# Patient Record
Sex: MALE | Race: BLACK OR AFRICAN AMERICAN | Employment: OTHER | URBAN - METROPOLITAN AREA
[De-identification: names, ages, dates, MRNs, and addresses within clinical notes are randomized per-mention and may not be internally consistent; named-entity substitution may affect disease eponyms.]

---

## 2017-01-04 RX ORDER — HYDRALAZINE HYDROCHLORIDE 50 MG/1
TABLET, FILM COATED ORAL
Qty: 60 TABLET | Refills: 2 | Status: SHIPPED | OUTPATIENT
Start: 2017-01-04 | End: 2017-04-18

## 2017-02-28 ENCOUNTER — APPOINTMENT (OUTPATIENT)
Dept: LAB | Age: 72
End: 2017-02-28
Attending: INTERNAL MEDICINE
Payer: COMMERCIAL

## 2017-02-28 ENCOUNTER — OFFICE VISIT (OUTPATIENT)
Dept: INTERNAL MEDICINE CLINIC | Facility: CLINIC | Age: 72
End: 2017-02-28

## 2017-02-28 VITALS
RESPIRATION RATE: 16 BRPM | SYSTOLIC BLOOD PRESSURE: 128 MMHG | HEIGHT: 69.5 IN | DIASTOLIC BLOOD PRESSURE: 76 MMHG | HEART RATE: 69 BPM | TEMPERATURE: 99 F | WEIGHT: 216.75 LBS | BODY MASS INDEX: 31.38 KG/M2 | OXYGEN SATURATION: 98 %

## 2017-02-28 DIAGNOSIS — I10 ESSENTIAL HYPERTENSION: Chronic | ICD-10-CM

## 2017-02-28 DIAGNOSIS — I10 ESSENTIAL HYPERTENSION: Primary | Chronic | ICD-10-CM

## 2017-02-28 LAB
BUN BLD-MCNC: 9 MG/DL (ref 8–20)
CALCIUM BLD-MCNC: 9.3 MG/DL (ref 8.3–10.3)
CHLORIDE: 105 MMOL/L (ref 101–111)
CO2: 32 MMOL/L (ref 22–32)
CREAT BLD-MCNC: 1.44 MG/DL (ref 0.7–1.3)
GLUCOSE BLD-MCNC: 92 MG/DL (ref 70–99)
POTASSIUM SERPL-SCNC: 4 MMOL/L (ref 3.6–5.1)
SODIUM SERPL-SCNC: 143 MMOL/L (ref 136–144)

## 2017-02-28 PROCEDURE — 99213 OFFICE O/P EST LOW 20 MIN: CPT | Performed by: INTERNAL MEDICINE

## 2017-02-28 PROCEDURE — 80048 BASIC METABOLIC PNL TOTAL CA: CPT

## 2017-02-28 PROCEDURE — 36415 COLL VENOUS BLD VENIPUNCTURE: CPT

## 2017-02-28 NOTE — PROGRESS NOTES
[address] [place] [address] 1945 is a 66year old male. Patient presents with:   Follow - Up: 6 month       HPI:       Current Outpatient Prescriptions:  HYDRALAZINE HCL 50 MG Oral Tab TAKE ONE TABLET BY MOUTH TWICE DAILY Disp: 60 tablet Rfl: 2   ATENOL follow - up. Diagnoses and all orders for this visit:    Essential hypertension  -     BASIC METABOLIC PANEL; Future        Patient Instructions   HTN  Monitor blood pressure twice a day and send or call office with readings.          The patient indicat

## 2017-03-07 DIAGNOSIS — R79.89 ELEVATED SERUM CREATININE: Primary | ICD-10-CM

## 2017-03-16 RX ORDER — FELODIPINE 5 MG/1
TABLET, EXTENDED RELEASE ORAL
Qty: 180 TABLET | Refills: 0 | Status: SHIPPED | OUTPATIENT
Start: 2017-03-16 | End: 2017-06-14

## 2017-03-29 RX ORDER — ATENOLOL 100 MG/1
TABLET ORAL
Qty: 90 TABLET | Refills: 0 | Status: SHIPPED | OUTPATIENT
Start: 2017-03-29 | End: 2017-06-29

## 2017-04-19 RX ORDER — HYDRALAZINE HYDROCHLORIDE 50 MG/1
TABLET, FILM COATED ORAL
Qty: 60 TABLET | Refills: 2 | Status: SHIPPED | OUTPATIENT
Start: 2017-04-19 | End: 2017-07-05

## 2017-06-15 RX ORDER — FELODIPINE 5 MG/1
TABLET, EXTENDED RELEASE ORAL
Qty: 180 TABLET | Refills: 0 | Status: SHIPPED | OUTPATIENT
Start: 2017-06-15 | End: 2017-09-14

## 2017-06-29 RX ORDER — ATENOLOL 100 MG/1
TABLET ORAL
Qty: 90 TABLET | Refills: 0 | Status: SHIPPED | OUTPATIENT
Start: 2017-06-29 | End: 2017-09-22

## 2017-07-05 RX ORDER — HYDRALAZINE HYDROCHLORIDE 50 MG/1
50 TABLET, FILM COATED ORAL 2 TIMES DAILY
Qty: 60 TABLET | Refills: 2 | Status: SHIPPED | OUTPATIENT
Start: 2017-07-05 | End: 2017-10-27

## 2017-07-05 NOTE — TELEPHONE ENCOUNTER
Pt should not need refills on atenolol or felodipine at this time. 90 day refills were authorized on 6/29/17 and 6/15/17. Refill for hydralazine sent to pharmacy.

## 2017-07-05 NOTE — TELEPHONE ENCOUNTER
Will be leaving [facility] around 7/24 until end of Aug and needs meds refilled prior to leaving:    ATENOLOL 100 MG Oral Tab    FELODIPINE ER 5 MG Oral Tablet     HYDRALAZINE HCL 50 MG Oral        [id number] - [address], [place] - [address]

## 2017-07-17 ENCOUNTER — LAB ENCOUNTER (OUTPATIENT)
Dept: LAB | Age: 72
End: 2017-07-17
Attending: INTERNAL MEDICINE
Payer: COMMERCIAL

## 2017-07-17 DIAGNOSIS — M88.9 PAGET DISEASE OF BONE: Chronic | ICD-10-CM

## 2017-07-17 DIAGNOSIS — E21.3 HYPERPARATHYROIDISM (HCC): Chronic | ICD-10-CM

## 2017-07-17 LAB — PTH-INTACT SERPL-MCNC: 129.7 PG/ML (ref 11.1–79.5)

## 2017-07-17 PROCEDURE — 83970 ASSAY OF PARATHORMONE: CPT

## 2017-07-17 PROCEDURE — 84080 ASSAY ALKALINE PHOSPHATASES: CPT

## 2017-07-17 PROCEDURE — 36415 COLL VENOUS BLD VENIPUNCTURE: CPT

## 2017-07-17 PROCEDURE — 82523 COLLAGEN CROSSLINKS: CPT

## 2017-07-19 LAB
BONE SPECIFIC ALKALINE PHOSPHATASE: 15.7 UG/L
C-TELOPEPTIDE, BETA-CROSS-LINK: 267 PG/ML

## 2017-09-14 RX ORDER — FELODIPINE 5 MG/1
TABLET, EXTENDED RELEASE ORAL
Qty: 180 TABLET | Refills: 0 | Status: SHIPPED | OUTPATIENT
Start: 2017-09-14 | End: 2017-12-07

## 2017-09-15 ENCOUNTER — OFFICE VISIT (OUTPATIENT)
Dept: INTERNAL MEDICINE CLINIC | Facility: CLINIC | Age: 72
End: 2017-09-15

## 2017-09-15 VITALS
HEART RATE: 76 BPM | WEIGHT: 212 LBS | HEIGHT: 70 IN | RESPIRATION RATE: 16 BRPM | SYSTOLIC BLOOD PRESSURE: 122 MMHG | BODY MASS INDEX: 30.35 KG/M2 | OXYGEN SATURATION: 98 % | DIASTOLIC BLOOD PRESSURE: 74 MMHG | TEMPERATURE: 98 F

## 2017-09-15 DIAGNOSIS — E79.0 HYPERURICEMIA: Chronic | ICD-10-CM

## 2017-09-15 DIAGNOSIS — N28.1 KIDNEY CYSTS: Chronic | ICD-10-CM

## 2017-09-15 DIAGNOSIS — Z00.00 ROUTINE GENERAL MEDICAL EXAMINATION AT A HEALTH CARE FACILITY: Primary | ICD-10-CM

## 2017-09-15 DIAGNOSIS — I10 ESSENTIAL HYPERTENSION: Chronic | ICD-10-CM

## 2017-09-15 PROCEDURE — 90670 PCV13 VACCINE IM: CPT | Performed by: INTERNAL MEDICINE

## 2017-09-15 PROCEDURE — 90686 IIV4 VACC NO PRSV 0.5 ML IM: CPT | Performed by: INTERNAL MEDICINE

## 2017-09-15 PROCEDURE — 90471 IMMUNIZATION ADMIN: CPT | Performed by: INTERNAL MEDICINE

## 2017-09-15 PROCEDURE — 93000 ELECTROCARDIOGRAM COMPLETE: CPT | Performed by: INTERNAL MEDICINE

## 2017-09-15 PROCEDURE — 99397 PER PM REEVAL EST PAT 65+ YR: CPT | Performed by: INTERNAL MEDICINE

## 2017-09-15 PROCEDURE — 90472 IMMUNIZATION ADMIN EACH ADD: CPT | Performed by: INTERNAL MEDICINE

## 2017-09-15 NOTE — PROGRESS NOTES
[address] [place] [address] 1945 is a 74year old male.     Patient presents with:  Physical      HPI:   No cc    Current Outpatient Prescriptions:  FELODIPINE ER 5 MG Oral Tablet 24 Hr TAKE ONE TABLET BY MOUTH TWICE DAILY Disp: 180 tablet Rfl: Tristen Cohn times per week   Gastrointestinal:   Patient denies abdominal pain, acid reflux, blood in stool, vomiting, nausea, heartburn denies any wt loss or gain no change in appetite noted no change in bowel movement noted. Dysphagia none.    Hematology:   Patient d Rhythm: regular. LUNGS:   Auscultation: clear . Chest Shape: normal .   Percussion: normal.   Rales: no .   Respiratory effort: normal .   Rhonchi: no.   Wheezes: no. ABDOMEN:   Bowel sounds: normal.   General: normal.   Hernia: absent.    Liver, Sp ML           The patient indicates understanding of these issues and agrees to the plan. The patient is asked to Return in about 2 weeks (around 9/29/2017) for result discussion.   Myriam Arias MD

## 2017-09-18 ENCOUNTER — LAB ENCOUNTER (OUTPATIENT)
Dept: LAB | Age: 72
End: 2017-09-18
Attending: INTERNAL MEDICINE
Payer: COMMERCIAL

## 2017-09-18 DIAGNOSIS — R79.89 ELEVATED SERUM CREATININE: ICD-10-CM

## 2017-09-18 DIAGNOSIS — E79.0 HYPERURICEMIA: Chronic | ICD-10-CM

## 2017-09-18 DIAGNOSIS — Z00.00 ROUTINE GENERAL MEDICAL EXAMINATION AT A HEALTH CARE FACILITY: ICD-10-CM

## 2017-09-18 LAB
ALBUMIN SERPL-MCNC: 3.4 G/DL (ref 3.5–4.8)
ALP LIVER SERPL-CCNC: 88 U/L (ref 45–117)
ALT SERPL-CCNC: 23 U/L (ref 17–63)
AST SERPL-CCNC: 14 U/L (ref 15–41)
BASOPHILS # BLD AUTO: 0.03 X10(3) UL (ref 0–0.1)
BASOPHILS NFR BLD AUTO: 0.4 %
BILIRUB SERPL-MCNC: 0.5 MG/DL (ref 0.1–2)
BILIRUB UR QL STRIP.AUTO: NEGATIVE
BUN BLD-MCNC: 9 MG/DL (ref 8–20)
CALCIUM BLD-MCNC: 8.4 MG/DL (ref 8.3–10.3)
CHLORIDE: 105 MMOL/L (ref 101–111)
CHOLEST SMN-MCNC: 114 MG/DL (ref ?–200)
CLARITY UR REFRACT.AUTO: CLEAR
CO2: 28 MMOL/L (ref 22–32)
COLOR UR AUTO: YELLOW
CREAT BLD-MCNC: 1.49 MG/DL (ref 0.7–1.3)
EOSINOPHIL # BLD AUTO: 0.05 X10(3) UL (ref 0–0.3)
EOSINOPHIL NFR BLD AUTO: 0.7 %
ERYTHROCYTE [DISTWIDTH] IN BLOOD BY AUTOMATED COUNT: 13.2 % (ref 11.5–16)
EST. AVERAGE GLUCOSE BLD GHB EST-MCNC: 126 MG/DL (ref 68–126)
GLUCOSE BLD-MCNC: 93 MG/DL (ref 70–99)
GLUCOSE UR STRIP.AUTO-MCNC: NEGATIVE MG/DL
HBA1C MFR BLD HPLC: 6 % (ref ?–5.7)
HCT VFR BLD AUTO: 40.8 % (ref 37–53)
HDLC SERPL-MCNC: 35 MG/DL (ref 45–?)
HDLC SERPL: 3.26 {RATIO} (ref ?–4.97)
HGB BLD-MCNC: 13 G/DL (ref 13–17)
IMMATURE GRANULOCYTE COUNT: 0.01 X10(3) UL (ref 0–1)
IMMATURE GRANULOCYTE RATIO %: 0.1 %
KETONES UR STRIP.AUTO-MCNC: NEGATIVE MG/DL
LDLC SERPL CALC-MCNC: 64 MG/DL (ref ?–130)
LDLC SERPL-MCNC: 15 MG/DL (ref 5–40)
LEUKOCYTE ESTERASE UR QL STRIP.AUTO: NEGATIVE
LYMPHOCYTES # BLD AUTO: 2.48 X10(3) UL (ref 0.9–4)
LYMPHOCYTES NFR BLD AUTO: 36.8 %
M PROTEIN MFR SERPL ELPH: 7.7 G/DL (ref 6.1–8.3)
MCH RBC QN AUTO: 29 PG (ref 27–33.2)
MCHC RBC AUTO-ENTMCNC: 31.9 G/DL (ref 31–37)
MCV RBC AUTO: 90.9 FL (ref 80–99)
MONOCYTES # BLD AUTO: 0.7 X10(3) UL (ref 0.1–0.6)
MONOCYTES NFR BLD AUTO: 10.4 %
NEUTROPHIL ABS PRELIM: 3.46 X10 (3) UL (ref 1.3–6.7)
NEUTROPHILS # BLD AUTO: 3.46 X10(3) UL (ref 1.3–6.7)
NEUTROPHILS NFR BLD AUTO: 51.6 %
NITRITE UR QL STRIP.AUTO: NEGATIVE
NONHDLC SERPL-MCNC: 79 MG/DL (ref ?–130)
PH UR STRIP.AUTO: 7 [PH] (ref 4.5–8)
PLATELET # BLD AUTO: 197 10(3)UL (ref 150–450)
POTASSIUM SERPL-SCNC: 3.8 MMOL/L (ref 3.6–5.1)
PROT UR STRIP.AUTO-MCNC: NEGATIVE MG/DL
PSA SERPL-MCNC: 3.74 NG/ML (ref 0.01–4)
RBC # BLD AUTO: 4.49 X10(6)UL (ref 3.8–5.8)
RBC UR QL AUTO: NEGATIVE
RED CELL DISTRIBUTION WIDTH-SD: 44.2 FL (ref 35.1–46.3)
SODIUM SERPL-SCNC: 139 MMOL/L (ref 136–144)
SP GR UR STRIP.AUTO: 1.02 (ref 1–1.03)
THYROXINE (T4): 7.6 UG/DL (ref 4.5–10.9)
TRIGLYCERIDES: 75 MG/DL (ref ?–150)
TSI SER-ACNC: 3.83 MIU/ML (ref 0.35–5.5)
URIC ACID: 7.1 MG/DL (ref 2.4–8.7)
UROBILINOGEN UR STRIP.AUTO-MCNC: <2 MG/DL
WBC # BLD AUTO: 6.7 X10(3) UL (ref 4–13)

## 2017-09-18 PROCEDURE — 83036 HEMOGLOBIN GLYCOSYLATED A1C: CPT

## 2017-09-18 PROCEDURE — 36415 COLL VENOUS BLD VENIPUNCTURE: CPT

## 2017-09-18 PROCEDURE — 81003 URINALYSIS AUTO W/O SCOPE: CPT

## 2017-09-18 PROCEDURE — 84436 ASSAY OF TOTAL THYROXINE: CPT

## 2017-09-18 PROCEDURE — 84153 ASSAY OF PSA TOTAL: CPT

## 2017-09-18 PROCEDURE — 84443 ASSAY THYROID STIM HORMONE: CPT

## 2017-09-18 PROCEDURE — 85025 COMPLETE CBC W/AUTO DIFF WBC: CPT

## 2017-09-18 PROCEDURE — 80053 COMPREHEN METABOLIC PANEL: CPT

## 2017-09-18 PROCEDURE — 80061 LIPID PANEL: CPT

## 2017-09-18 PROCEDURE — 84550 ASSAY OF BLOOD/URIC ACID: CPT

## 2017-09-22 ENCOUNTER — HOSPITAL ENCOUNTER (OUTPATIENT)
Dept: ULTRASOUND IMAGING | Age: 72
Discharge: HOME | End: 2017-09-22
Attending: INTERNAL MEDICINE
Payer: COMMERCIAL

## 2017-09-22 DIAGNOSIS — N28.1 KIDNEY CYSTS: Chronic | ICD-10-CM

## 2017-09-22 PROCEDURE — 76775 US EXAM ABDO BACK WALL LIM: CPT | Performed by: INTERNAL MEDICINE

## 2017-09-22 RX ORDER — ATENOLOL 100 MG/1
TABLET ORAL
Qty: 90 TABLET | Refills: 0 | Status: SHIPPED | OUTPATIENT
Start: 2017-09-22 | End: 2017-12-22

## 2017-10-06 ENCOUNTER — OFFICE VISIT (OUTPATIENT)
Dept: INTERNAL MEDICINE CLINIC | Facility: CLINIC | Age: 72
End: 2017-10-06

## 2017-10-06 VITALS
DIASTOLIC BLOOD PRESSURE: 70 MMHG | SYSTOLIC BLOOD PRESSURE: 142 MMHG | WEIGHT: 214 LBS | HEART RATE: 72 BPM | RESPIRATION RATE: 16 BRPM | OXYGEN SATURATION: 98 % | BODY MASS INDEX: 31.7 KG/M2 | HEIGHT: 69 IN

## 2017-10-06 DIAGNOSIS — H61.22 CERUMEN DEBRIS ON TYMPANIC MEMBRANE OF LEFT EAR: Primary | ICD-10-CM

## 2017-10-06 PROCEDURE — 99213 OFFICE O/P EST LOW 20 MIN: CPT | Performed by: INTERNAL MEDICINE

## 2017-10-06 NOTE — PROGRESS NOTES
[address] [place] [address] 1945 is a 74year old male.     Patient presents with:  Ear Problem: itching      HPI:   Ear/General:    Presents with c/o wax buildup in the left ear      Current Outpatient Prescriptions:  ATENOLOL 100 MG Oral Tab TAKE ONE TAB

## 2017-10-28 RX ORDER — HYDRALAZINE HYDROCHLORIDE 50 MG/1
TABLET, FILM COATED ORAL
Qty: 60 TABLET | Refills: 2 | Status: SHIPPED | OUTPATIENT
Start: 2017-10-28 | End: 2018-01-25

## 2017-12-07 RX ORDER — FELODIPINE 5 MG/1
TABLET, EXTENDED RELEASE ORAL
Qty: 180 TABLET | Refills: 0 | Status: SHIPPED | OUTPATIENT
Start: 2017-12-07 | End: 2018-03-08

## 2017-12-26 RX ORDER — ATENOLOL 100 MG/1
TABLET ORAL
Qty: 90 TABLET | Refills: 0 | Status: SHIPPED | OUTPATIENT
Start: 2017-12-26 | End: 2018-03-23

## 2018-01-26 RX ORDER — HYDRALAZINE HYDROCHLORIDE 50 MG/1
TABLET, FILM COATED ORAL
Qty: 60 TABLET | Refills: 2 | Status: SHIPPED | OUTPATIENT
Start: 2018-01-26 | End: 2018-04-25

## 2018-03-09 RX ORDER — FELODIPINE 5 MG/1
TABLET, EXTENDED RELEASE ORAL
Qty: 180 TABLET | Refills: 0 | Status: SHIPPED | OUTPATIENT
Start: 2018-03-09 | End: 2018-06-07

## 2018-03-23 ENCOUNTER — APPOINTMENT (OUTPATIENT)
Dept: LAB | Age: 73
End: 2018-03-23
Attending: INTERNAL MEDICINE
Payer: COMMERCIAL

## 2018-03-23 ENCOUNTER — OFFICE VISIT (OUTPATIENT)
Dept: INTERNAL MEDICINE CLINIC | Facility: CLINIC | Age: 73
End: 2018-03-23

## 2018-03-23 VITALS
BODY MASS INDEX: 32 KG/M2 | HEART RATE: 72 BPM | WEIGHT: 216 LBS | RESPIRATION RATE: 16 BRPM | OXYGEN SATURATION: 96 % | SYSTOLIC BLOOD PRESSURE: 132 MMHG | TEMPERATURE: 98 F | DIASTOLIC BLOOD PRESSURE: 70 MMHG

## 2018-03-23 DIAGNOSIS — R89.9 ABNORMAL LABORATORY TEST RESULT: ICD-10-CM

## 2018-03-23 DIAGNOSIS — R79.89 ELEVATED SERUM CREATININE: Chronic | ICD-10-CM

## 2018-03-23 DIAGNOSIS — I10 ESSENTIAL HYPERTENSION: Chronic | ICD-10-CM

## 2018-03-23 DIAGNOSIS — I10 ESSENTIAL HYPERTENSION: Primary | Chronic | ICD-10-CM

## 2018-03-23 LAB
BUN BLD-MCNC: 12 MG/DL (ref 8–20)
CALCIUM BLD-MCNC: 8.8 MG/DL (ref 8.3–10.3)
CHLORIDE: 104 MMOL/L (ref 101–111)
CO2: 26 MMOL/L (ref 22–32)
CREAT BLD-MCNC: 1.37 MG/DL (ref 0.7–1.3)
EST. AVERAGE GLUCOSE BLD GHB EST-MCNC: 114 MG/DL (ref 68–126)
GLUCOSE BLD-MCNC: 86 MG/DL (ref 70–99)
HBA1C MFR BLD HPLC: 5.6 % (ref ?–5.7)
POTASSIUM SERPL-SCNC: 4.3 MMOL/L (ref 3.6–5.1)
SODIUM SERPL-SCNC: 138 MMOL/L (ref 136–144)

## 2018-03-23 PROCEDURE — 80048 BASIC METABOLIC PNL TOTAL CA: CPT | Performed by: INTERNAL MEDICINE

## 2018-03-23 PROCEDURE — 83036 HEMOGLOBIN GLYCOSYLATED A1C: CPT | Performed by: INTERNAL MEDICINE

## 2018-03-23 PROCEDURE — 99213 OFFICE O/P EST LOW 20 MIN: CPT | Performed by: INTERNAL MEDICINE

## 2018-03-23 PROCEDURE — 36415 COLL VENOUS BLD VENIPUNCTURE: CPT | Performed by: INTERNAL MEDICINE

## 2018-03-23 RX ORDER — ATENOLOL 100 MG/1
TABLET ORAL
Qty: 90 TABLET | Refills: 0 | Status: SHIPPED | OUTPATIENT
Start: 2018-03-23 | End: 2018-06-18

## 2018-03-23 NOTE — PROGRESS NOTES
[address] [place] [address] 1945 is a 74year old male. Patient presents with:   Follow - Up: 6 months       HPI:       Current Outpatient Prescriptions:  ATENOLOL 100 MG Oral Tab TAKE ONE TABLET BY MOUTH ONCE DAILY Disp: 90 tablet Rfl: 0   FELODIPINE E seen today for follow - up. Diagnoses and all orders for this visit:    Essential hypertension  -     BASIC METABOLIC PANEL (8);  Future    Elevated serum creatinine        Patient Instructions   See me in 6 months       The patient indicates understandi

## 2018-04-26 NOTE — TELEPHONE ENCOUNTER
From: Donna Clifton  Sent: 4/26/2018 2:16 PM CDT  Subject: Medication Renewal Request    Easton Humphrey would like a refill of the following medications:     HYDRALAZINE HCL 50 MG Oral Tab Elieser Lawrence MD]    [facility]

## 2018-04-27 RX ORDER — HYDRALAZINE HYDROCHLORIDE 50 MG/1
50 TABLET, FILM COATED ORAL 2 TIMES DAILY
Qty: 60 TABLET | Refills: 2
Start: 2018-04-27

## 2018-04-27 RX ORDER — HYDRALAZINE HYDROCHLORIDE 50 MG/1
TABLET, FILM COATED ORAL
Qty: 60 TABLET | Refills: 2 | Status: SHIPPED | OUTPATIENT
Start: 2018-04-27 | End: 2018-07-23

## 2018-04-27 NOTE — TELEPHONE ENCOUNTER
Medication passed protocol. Requesting Hydralazine 50 mg  LOV: 3/23/18  RTC: 6 months  Last Relevant Labs: 3/23/18    Filled: Last sent as   60 with 2 refills on 1/26/18.      Future Appointments  Date Time Provider Julito Barnhart   9/4/2018 12:00 PM

## 2018-05-25 ENCOUNTER — OFFICE VISIT (OUTPATIENT)
Dept: INTERNAL MEDICINE CLINIC | Facility: CLINIC | Age: 73
End: 2018-05-25

## 2018-05-25 VITALS
WEIGHT: 222 LBS | RESPIRATION RATE: 16 BRPM | TEMPERATURE: 98 F | DIASTOLIC BLOOD PRESSURE: 70 MMHG | SYSTOLIC BLOOD PRESSURE: 120 MMHG | BODY MASS INDEX: 33 KG/M2 | OXYGEN SATURATION: 98 % | HEART RATE: 70 BPM

## 2018-05-25 DIAGNOSIS — I10 ESSENTIAL HYPERTENSION: Chronic | ICD-10-CM

## 2018-05-25 DIAGNOSIS — Z01.818 PREOP EXAM FOR INTERNAL MEDICINE: Primary | ICD-10-CM

## 2018-05-25 PROCEDURE — 99214 OFFICE O/P EST MOD 30 MIN: CPT | Performed by: INTERNAL MEDICINE

## 2018-05-25 NOTE — PROGRESS NOTES
[address] [place] [address] 1945 is a 74year old male.     Patient presents with:  Pre-Op Exam: Left eye cateract, 18      HPI:   He  has had previous anesthesia:  yes  Previous complications:  none    Current Outpatient Prescriptions:  HYDRALAZINE H denies abdominal pain, blood in stool, constipation, diarrhea, difficulty swallowing, change in stools, heartburn, nausea, vomiting no weight changes noted   Hematology:   Patient denies abnormal bleeding, easy bleeding, easy bruising.  Enlarged lymph nodes these issues and agrees to the plan. The patient is asked to Return in about 6 months (around 11/25/2018).   Tamir Moore MD

## 2018-05-26 ENCOUNTER — TELEPHONE (OUTPATIENT)
Dept: INTERNAL MEDICINE CLINIC | Facility: CLINIC | Age: 73
End: 2018-05-26

## 2018-06-08 RX ORDER — FELODIPINE 5 MG/1
5 TABLET, EXTENDED RELEASE ORAL 2 TIMES DAILY
Qty: 180 TABLET | Refills: 0 | Status: SHIPPED | OUTPATIENT
Start: 2018-06-08 | End: 2018-09-04

## 2018-06-08 NOTE — TELEPHONE ENCOUNTER
Refill requested: Felodipine ER 5 mg     Passed protocol      Last refill: 3/9/18  #180 NR   Relevant Labs: BMP 3/23/18   BP Readings from Last 3 Encounters:  05/25/18 : 120/70  03/23/18 : 132/70  10/06/17 : 142/70      Last OV / RTC advised: 5/25/18  RTC

## 2018-06-18 RX ORDER — ATENOLOL 100 MG/1
TABLET ORAL
Qty: 90 TABLET | Refills: 0 | Status: SHIPPED | OUTPATIENT
Start: 2018-06-18 | End: 2018-09-13

## 2018-07-24 RX ORDER — HYDRALAZINE HYDROCHLORIDE 50 MG/1
TABLET, FILM COATED ORAL
Qty: 60 TABLET | Refills: 2 | Status: SHIPPED | OUTPATIENT
Start: 2018-07-24 | End: 2018-10-22

## 2018-07-24 NOTE — TELEPHONE ENCOUNTER
Medication(s) to Refill:   Pending Prescriptions Disp Refills    HYDRALAZINE HCL 50 MG Oral Tab [Pharmacy Med Name: HYDRALAZINE 50MG    TAB] 60 tablet 2     Sig: TAKE 1 TABLET BY MOUTH TWICE DAILY         Last Time Medication was Filled:  04/27/2018      L

## 2018-08-27 ENCOUNTER — APPOINTMENT (OUTPATIENT)
Dept: LAB | Age: 73
End: 2018-08-27
Attending: INTERNAL MEDICINE
Payer: COMMERCIAL

## 2018-08-27 DIAGNOSIS — M88.9 PAGET DISEASE OF BONE: Chronic | ICD-10-CM

## 2018-08-27 DIAGNOSIS — E21.3 HYPERPARATHYROIDISM (HCC): Chronic | ICD-10-CM

## 2018-08-27 LAB
PTH-INTACT SERPL-MCNC: 167.3 PG/ML (ref 11.1–79.5)
VIT D+METAB SERPL-MCNC: 27.4 NG/ML (ref 30–100)

## 2018-08-27 PROCEDURE — 82306 VITAMIN D 25 HYDROXY: CPT

## 2018-08-27 PROCEDURE — 84080 ASSAY ALKALINE PHOSPHATASES: CPT

## 2018-08-27 PROCEDURE — 36415 COLL VENOUS BLD VENIPUNCTURE: CPT

## 2018-08-27 PROCEDURE — 82523 COLLAGEN CROSSLINKS: CPT

## 2018-08-27 PROCEDURE — 83970 ASSAY OF PARATHORMONE: CPT

## 2018-08-28 LAB
BONE SPECIFIC ALKALINE PHOSPHATASE: 16.2 UG/L
C-TELOPEPTIDE, BETA-CROSS-LINK: 450 PG/ML

## 2018-09-06 RX ORDER — FELODIPINE 5 MG/1
TABLET, EXTENDED RELEASE ORAL
Qty: 180 TABLET | Refills: 0 | Status: SHIPPED | OUTPATIENT
Start: 2018-09-06 | End: 2018-12-03

## 2018-09-06 NOTE — TELEPHONE ENCOUNTER
Medication(s) to Refill:   Pending Prescriptions Disp Refills    FELODIPINE ER 5 MG Oral Tablet 24 Hr [Pharmacy Med Name: FELODIPINE ER 5MG   TAB] 180 tablet 0     Sig: TAKE 1 TABLET BY MOUTH TWICE DAILY         Last Time Medication was Filled: 6/8/2018 #

## 2018-09-14 RX ORDER — ATENOLOL 100 MG/1
TABLET ORAL
Qty: 90 TABLET | Refills: 0 | Status: SHIPPED | OUTPATIENT
Start: 2018-09-14 | End: 2018-12-12

## 2018-09-14 NOTE — TELEPHONE ENCOUNTER
Medication(s) to Refill:   Requested Prescriptions     Pending Prescriptions Disp Refills   • ATENOLOL 100 MG Oral Tab [Pharmacy Med Name: ATENOLOL 100MG      TAB] 90 tablet 0     Sig: TAKE 1 TABLET BY MOUTH ONCE DAILY       Last Time Medication was Filled

## 2018-09-17 ENCOUNTER — OFFICE VISIT (OUTPATIENT)
Dept: INTERNAL MEDICINE CLINIC | Facility: CLINIC | Age: 73
End: 2018-09-17
Payer: COMMERCIAL

## 2018-09-17 ENCOUNTER — LAB ENCOUNTER (OUTPATIENT)
Dept: LAB | Age: 73
End: 2018-09-17
Attending: INTERNAL MEDICINE
Payer: COMMERCIAL

## 2018-09-17 VITALS
SYSTOLIC BLOOD PRESSURE: 130 MMHG | RESPIRATION RATE: 16 BRPM | TEMPERATURE: 98 F | BODY MASS INDEX: 31.41 KG/M2 | HEIGHT: 69.25 IN | HEART RATE: 74 BPM | WEIGHT: 214.5 LBS | DIASTOLIC BLOOD PRESSURE: 80 MMHG | OXYGEN SATURATION: 97 %

## 2018-09-17 DIAGNOSIS — Z00.00 ROUTINE GENERAL MEDICAL EXAMINATION AT A HEALTH CARE FACILITY: Primary | ICD-10-CM

## 2018-09-17 DIAGNOSIS — Z00.00 ROUTINE GENERAL MEDICAL EXAMINATION AT A HEALTH CARE FACILITY: ICD-10-CM

## 2018-09-17 DIAGNOSIS — I10 ESSENTIAL HYPERTENSION: Chronic | ICD-10-CM

## 2018-09-17 LAB
ALBUMIN SERPL-MCNC: 3.5 G/DL (ref 3.5–4.8)
ALBUMIN/GLOB SERPL: 0.9 {RATIO} (ref 1–2)
ALP LIVER SERPL-CCNC: 88 U/L (ref 45–117)
ALT SERPL-CCNC: 25 U/L (ref 17–63)
ANION GAP SERPL CALC-SCNC: 8 MMOL/L (ref 0–18)
AST SERPL-CCNC: 22 U/L (ref 15–41)
BASOPHILS # BLD AUTO: 0.03 X10(3) UL (ref 0–0.1)
BASOPHILS NFR BLD AUTO: 0.5 %
BILIRUB SERPL-MCNC: 0.5 MG/DL (ref 0.1–2)
BILIRUB UR QL STRIP.AUTO: NEGATIVE
BUN BLD-MCNC: 13 MG/DL (ref 8–20)
BUN/CREAT SERPL: 9.3 (ref 10–20)
CALCIUM BLD-MCNC: 8.7 MG/DL (ref 8.3–10.3)
CHLORIDE SERPL-SCNC: 107 MMOL/L (ref 101–111)
CHOLEST SMN-MCNC: 141 MG/DL (ref ?–200)
CLARITY UR REFRACT.AUTO: CLEAR
CO2 SERPL-SCNC: 26 MMOL/L (ref 22–32)
COLOR UR AUTO: YELLOW
CREAT BLD-MCNC: 1.4 MG/DL (ref 0.7–1.3)
EOSINOPHIL # BLD AUTO: 0.06 X10(3) UL (ref 0–0.3)
EOSINOPHIL NFR BLD AUTO: 1 %
ERYTHROCYTE [DISTWIDTH] IN BLOOD BY AUTOMATED COUNT: 13.8 % (ref 11.5–16)
EST. AVERAGE GLUCOSE BLD GHB EST-MCNC: 120 MG/DL (ref 68–126)
GLOBULIN PLAS-MCNC: 4 G/DL (ref 2.5–4)
GLUCOSE BLD-MCNC: 99 MG/DL (ref 70–99)
GLUCOSE UR STRIP.AUTO-MCNC: NEGATIVE MG/DL
HBA1C MFR BLD HPLC: 5.8 % (ref ?–5.7)
HCT VFR BLD AUTO: 40.9 % (ref 37–53)
HDLC SERPL-MCNC: 29 MG/DL (ref 40–59)
HGB BLD-MCNC: 13.3 G/DL (ref 13–17)
IMMATURE GRANULOCYTE COUNT: 0.01 X10(3) UL (ref 0–1)
IMMATURE GRANULOCYTE RATIO %: 0.2 %
KETONES UR STRIP.AUTO-MCNC: NEGATIVE MG/DL
LDLC SERPL CALC-MCNC: 90 MG/DL (ref ?–100)
LEUKOCYTE ESTERASE UR QL STRIP.AUTO: NEGATIVE
LYMPHOCYTES # BLD AUTO: 2.27 X10(3) UL (ref 0.9–4)
LYMPHOCYTES NFR BLD AUTO: 39 %
M PROTEIN MFR SERPL ELPH: 7.5 G/DL (ref 6.1–8.3)
MCH RBC QN AUTO: 29.6 PG (ref 27–33.2)
MCHC RBC AUTO-ENTMCNC: 32.5 G/DL (ref 31–37)
MCV RBC AUTO: 90.9 FL (ref 80–99)
MONOCYTES # BLD AUTO: 0.64 X10(3) UL (ref 0.1–1)
MONOCYTES NFR BLD AUTO: 11 %
NEUTROPHIL ABS PRELIM: 2.81 X10 (3) UL (ref 1.3–6.7)
NEUTROPHILS # BLD AUTO: 2.81 X10(3) UL (ref 1.3–6.7)
NEUTROPHILS NFR BLD AUTO: 48.3 %
NITRITE UR QL STRIP.AUTO: NEGATIVE
NONHDLC SERPL-MCNC: 112 MG/DL (ref ?–130)
OSMOLALITY SERPL CALC.SUM OF ELEC: 292 MOSM/KG (ref 275–295)
PH UR STRIP.AUTO: 6 [PH] (ref 4.5–8)
PLATELET # BLD AUTO: 202 10(3)UL (ref 150–450)
POTASSIUM SERPL-SCNC: 4 MMOL/L (ref 3.6–5.1)
PROT UR STRIP.AUTO-MCNC: NEGATIVE MG/DL
PSA SERPL-MCNC: 4.21 NG/ML (ref 0.01–4)
RBC # BLD AUTO: 4.5 X10(6)UL (ref 3.8–5.8)
RBC UR QL AUTO: NEGATIVE
RED CELL DISTRIBUTION WIDTH-SD: 46.4 FL (ref 35.1–46.3)
SODIUM SERPL-SCNC: 141 MMOL/L (ref 136–144)
SP GR UR STRIP.AUTO: 1.02 (ref 1–1.03)
T4 SERPL-MCNC: 8.3 UG/DL (ref 4.5–10.9)
TRIGL SERPL-MCNC: 111 MG/DL (ref 30–149)
TSI SER-ACNC: 2.84 MIU/ML (ref 0.35–5.5)
UROBILINOGEN UR STRIP.AUTO-MCNC: <2 MG/DL
VLDLC SERPL CALC-MCNC: 22 MG/DL (ref 0–30)
WBC # BLD AUTO: 5.8 X10(3) UL (ref 4–13)

## 2018-09-17 PROCEDURE — 81003 URINALYSIS AUTO W/O SCOPE: CPT

## 2018-09-17 PROCEDURE — 80053 COMPREHEN METABOLIC PANEL: CPT

## 2018-09-17 PROCEDURE — 84436 ASSAY OF TOTAL THYROXINE: CPT

## 2018-09-17 PROCEDURE — 83036 HEMOGLOBIN GLYCOSYLATED A1C: CPT

## 2018-09-17 PROCEDURE — 85025 COMPLETE CBC W/AUTO DIFF WBC: CPT

## 2018-09-17 PROCEDURE — 80061 LIPID PANEL: CPT

## 2018-09-17 PROCEDURE — 84443 ASSAY THYROID STIM HORMONE: CPT

## 2018-09-17 PROCEDURE — 84153 ASSAY OF PSA TOTAL: CPT

## 2018-09-17 PROCEDURE — 93000 ELECTROCARDIOGRAM COMPLETE: CPT | Performed by: INTERNAL MEDICINE

## 2018-09-17 PROCEDURE — 99397 PER PM REEVAL EST PAT 65+ YR: CPT | Performed by: INTERNAL MEDICINE

## 2018-09-17 PROCEDURE — 36415 COLL VENOUS BLD VENIPUNCTURE: CPT

## 2018-09-17 NOTE — PROGRESS NOTES
[address] [place] [address] 1945 is a 74year old male.     Patient presents with:  Physical      HPI:   No cc    Current Outpatient Medications:  ATENOLOL 100 MG Oral Tab TAKE 1 TABLET BY MOUTH ONCE DAILY Disp: 90 tablet Rfl: 0   FELODIPINE ER 5 MG Oral T pain, acid reflux, blood in stool, vomiting, nausea, heartburn denies any wt loss or gain no change in appetite noted no change in bowel movement noted. Dysphagia none. Hematology:   Patient denies abnormal bleeding, easy bruising.  Enlarged lymph nodes n effort: normal .   Rhonchi: no.   Wheezes: no. ABDOMEN:   Bowel sounds: normal.   General: normal.   Hernia: absent. Liver, Spleen: no hepatosplenomegaly (HSM). Tenderness: absent . GENITOURINARY - MALE:   External genitals: unremarkable.    AMANDA: no

## 2018-10-08 ENCOUNTER — OFFICE VISIT (OUTPATIENT)
Dept: INTERNAL MEDICINE CLINIC | Facility: CLINIC | Age: 73
End: 2018-10-08
Payer: COMMERCIAL

## 2018-10-08 VITALS
BODY MASS INDEX: 32 KG/M2 | RESPIRATION RATE: 16 BRPM | DIASTOLIC BLOOD PRESSURE: 70 MMHG | OXYGEN SATURATION: 96 % | HEART RATE: 75 BPM | TEMPERATURE: 98 F | SYSTOLIC BLOOD PRESSURE: 126 MMHG | WEIGHT: 217.75 LBS

## 2018-10-08 DIAGNOSIS — I10 ESSENTIAL HYPERTENSION: Primary | Chronic | ICD-10-CM

## 2018-10-08 DIAGNOSIS — E79.0 HYPERURICEMIA: Chronic | ICD-10-CM

## 2018-10-08 DIAGNOSIS — R97.20 ELEVATED PSA: ICD-10-CM

## 2018-10-08 DIAGNOSIS — R79.89 ELEVATED SERUM CREATININE: Chronic | ICD-10-CM

## 2018-10-08 PROCEDURE — 99213 OFFICE O/P EST LOW 20 MIN: CPT | Performed by: INTERNAL MEDICINE

## 2018-10-08 NOTE — PATIENT INSTRUCTIONS
Labs discussed with patient. Patient had consult with the urologist few years ago for an elevated PSA and was instructed not to follow-up anymore. Patient offered an opinion for a urologist however he wants to defer.   Wants repeat blood test in 6 months

## 2018-10-08 NOTE — PROGRESS NOTES
[address] [place] [address] 1945 is a 74year old male. Patient presents with:   Follow - Up       HPI:   BP check lab results    Current Outpatient Medications:  ATENOLOL 100 MG Oral Tab TAKE 1 TABLET BY MOUTH ONCE DAILY Disp: 90 tablet Rfl: 0   LIZ up.    Diagnoses and all orders for this visit:    Essential hypertension    Elevated serum creatinine  -     BASIC METABOLIC PANEL (8); Future  -     HEMOGLOBIN A1C; Future    Elevated PSA  -     PSA;  Future    Hyperuricemia  -     URIC ACID, SERUM; Futur

## 2018-10-23 RX ORDER — HYDRALAZINE HYDROCHLORIDE 50 MG/1
TABLET, FILM COATED ORAL
Qty: 60 TABLET | Refills: 2 | Status: SHIPPED | OUTPATIENT
Start: 2018-10-23 | End: 2019-01-18

## 2018-10-23 NOTE — TELEPHONE ENCOUNTER
Medication(s) to Refill:   Requested Prescriptions     Pending Prescriptions Disp Refills   • HYDRALAZINE HCL 50 MG Oral Tab [Pharmacy Med Name: HYDRALAZINE 50MG    TAB] 60 tablet 2     Sig: TAKE 1 TABLET BY MOUTH TWICE DAILY       Last Time Medication was

## 2018-12-03 RX ORDER — FELODIPINE 5 MG/1
TABLET, EXTENDED RELEASE ORAL
Qty: 180 TABLET | Refills: 0 | Status: SHIPPED | OUTPATIENT
Start: 2018-12-03 | End: 2019-03-04

## 2018-12-13 RX ORDER — ATENOLOL 100 MG/1
TABLET ORAL
Qty: 90 TABLET | Refills: 0 | Status: SHIPPED | OUTPATIENT
Start: 2018-12-13 | End: 2019-03-04

## 2019-01-18 RX ORDER — HYDRALAZINE HYDROCHLORIDE 50 MG/1
TABLET, FILM COATED ORAL
Qty: 60 TABLET | Refills: 2 | Status: SHIPPED | OUTPATIENT
Start: 2019-01-18 | End: 2019-04-19

## 2019-01-18 NOTE — TELEPHONE ENCOUNTER
Protocol passed.     Medication(s) to Refill:   Requested Prescriptions     Pending Prescriptions Disp Refills   • HYDRALAZINE HCL 50 MG Oral Tab [Pharmacy Med Name: HYDRALAZINE 50MG    TAB] 60 tablet 2     Sig: TAKE 1 TABLET BY MOUTH TWICE DAILY       Last

## 2019-03-05 RX ORDER — FELODIPINE 5 MG/1
TABLET, EXTENDED RELEASE ORAL
Qty: 180 TABLET | Refills: 0 | Status: SHIPPED | OUTPATIENT
Start: 2019-03-05 | End: 2019-06-03

## 2019-03-05 RX ORDER — ATENOLOL 100 MG/1
TABLET ORAL
Qty: 90 TABLET | Refills: 0 | Status: SHIPPED | OUTPATIENT
Start: 2019-03-05 | End: 2019-06-10

## 2019-03-11 ENCOUNTER — APPOINTMENT (OUTPATIENT)
Dept: LAB | Age: 74
End: 2019-03-11
Attending: INTERNAL MEDICINE
Payer: COMMERCIAL

## 2019-03-11 DIAGNOSIS — E79.0 HYPERURICEMIA: Chronic | ICD-10-CM

## 2019-03-11 DIAGNOSIS — R97.20 ELEVATED PSA: ICD-10-CM

## 2019-03-11 DIAGNOSIS — R79.89 ELEVATED SERUM CREATININE: Chronic | ICD-10-CM

## 2019-03-11 LAB
ANION GAP SERPL CALC-SCNC: 6 MMOL/L (ref 0–18)
BUN BLD-MCNC: 14 MG/DL (ref 7–18)
BUN/CREAT SERPL: 8.2 (ref 10–20)
CALCIUM BLD-MCNC: 9 MG/DL (ref 8.5–10.1)
CHLORIDE SERPL-SCNC: 105 MMOL/L (ref 98–107)
CO2 SERPL-SCNC: 27 MMOL/L (ref 21–32)
CREAT BLD-MCNC: 1.7 MG/DL (ref 0.7–1.3)
EST. AVERAGE GLUCOSE BLD GHB EST-MCNC: 120 MG/DL (ref 68–126)
GLUCOSE BLD-MCNC: 98 MG/DL (ref 70–99)
HBA1C MFR BLD HPLC: 5.8 % (ref ?–5.7)
OSMOLALITY SERPL CALC.SUM OF ELEC: 286 MOSM/KG (ref 275–295)
POTASSIUM SERPL-SCNC: 3.6 MMOL/L (ref 3.5–5.1)
PSA SERPL-MCNC: 3.95 NG/ML (ref ?–4)
SODIUM SERPL-SCNC: 138 MMOL/L (ref 136–145)
URATE SERPL-MCNC: 8.4 MG/DL (ref 3.5–7.2)

## 2019-03-11 PROCEDURE — 83036 HEMOGLOBIN GLYCOSYLATED A1C: CPT | Performed by: INTERNAL MEDICINE

## 2019-03-11 PROCEDURE — 80048 BASIC METABOLIC PNL TOTAL CA: CPT | Performed by: INTERNAL MEDICINE

## 2019-03-11 PROCEDURE — 36415 COLL VENOUS BLD VENIPUNCTURE: CPT | Performed by: INTERNAL MEDICINE

## 2019-03-11 PROCEDURE — 84153 ASSAY OF PSA TOTAL: CPT | Performed by: INTERNAL MEDICINE

## 2019-03-11 PROCEDURE — 84550 ASSAY OF BLOOD/URIC ACID: CPT | Performed by: INTERNAL MEDICINE

## 2019-03-25 ENCOUNTER — OFFICE VISIT (OUTPATIENT)
Dept: INTERNAL MEDICINE CLINIC | Facility: CLINIC | Age: 74
End: 2019-03-25
Payer: COMMERCIAL

## 2019-03-25 VITALS
HEART RATE: 72 BPM | DIASTOLIC BLOOD PRESSURE: 82 MMHG | TEMPERATURE: 98 F | HEIGHT: 69 IN | BODY MASS INDEX: 32.81 KG/M2 | SYSTOLIC BLOOD PRESSURE: 136 MMHG | RESPIRATION RATE: 16 BRPM | WEIGHT: 221.5 LBS | OXYGEN SATURATION: 97 %

## 2019-03-25 DIAGNOSIS — E79.0 HYPERURICEMIA: Chronic | ICD-10-CM

## 2019-03-25 DIAGNOSIS — R79.89 ELEVATED SERUM CREATININE: Chronic | ICD-10-CM

## 2019-03-25 DIAGNOSIS — I10 ESSENTIAL HYPERTENSION: Primary | ICD-10-CM

## 2019-03-25 PROBLEM — R97.20 ELEVATED PSA: Status: RESOLVED | Noted: 2018-10-08 | Resolved: 2019-03-25

## 2019-03-25 PROCEDURE — 99213 OFFICE O/P EST LOW 20 MIN: CPT | Performed by: INTERNAL MEDICINE

## 2019-03-25 PROCEDURE — 82575 CREATININE CLEARANCE TEST: CPT

## 2019-03-25 RX ORDER — ALLOPURINOL 100 MG/1
100 TABLET ORAL DAILY
Qty: 90 TABLET | Refills: 3 | Status: SHIPPED | OUTPATIENT
Start: 2019-03-25 | End: 2019-06-23

## 2019-03-25 NOTE — PROGRESS NOTES
[address] [place] [address] 1945 is a 74year old male. Patient presents with:  Medication Follow-Up       HPI:   Routine check no complaints    Current Outpatient Medications:  allopurinol 100 MG Oral Tab Take 1 tablet (100 mg total) by mouth daily.  D wheezing/rhonchi/rales. Breath sounds bilaterally: symmetrical.       ASSESSMENT AND PLAN:   Easton was seen today for medication follow-up.     Diagnoses and all orders for this visit:    Essential hypertension    Hyperuricemia  -     allopurinol 100 M

## 2019-03-26 ENCOUNTER — LAB ENCOUNTER (OUTPATIENT)
Dept: LAB | Age: 74
End: 2019-03-26
Attending: INTERNAL MEDICINE
Payer: COMMERCIAL

## 2019-03-26 DIAGNOSIS — R79.89 ELEVATED SERUM CREATININE: ICD-10-CM

## 2019-03-26 LAB
BILIRUB UR QL STRIP.AUTO: NEGATIVE
BUN BLD-MCNC: 13 MG/DL (ref 7–18)
CALCIUM BLD-MCNC: 9.1 MG/DL (ref 8.5–10.1)
CHLORIDE SERPL-SCNC: 107 MMOL/L (ref 98–107)
CLARITY UR REFRACT.AUTO: CLEAR
CO2 SERPL-SCNC: 26 MMOL/L (ref 21–32)
COLOR UR AUTO: YELLOW
CREAT 24H UR-MCNC: 93 ML/MIN (ref 75–125)
CREAT BLD-MCNC: 1.57 MG/DL (ref 0.7–1.3)
GLUCOSE BLD-MCNC: 98 MG/DL (ref 70–99)
GLUCOSE UR STRIP.AUTO-MCNC: NEGATIVE MG/DL
KETONES UR STRIP.AUTO-MCNC: NEGATIVE MG/DL
LEUKOCYTE ESTERASE UR QL STRIP.AUTO: NEGATIVE
NITRITE UR QL STRIP.AUTO: NEGATIVE
PH UR STRIP.AUTO: 7 [PH] (ref 4.5–8)
POTASSIUM SERPL-SCNC: 4.2 MMOL/L (ref 3.5–5.1)
PROT UR STRIP.AUTO-MCNC: NEGATIVE MG/DL
RBC UR QL AUTO: NEGATIVE
SODIUM SERPL-SCNC: 140 MMOL/L (ref 136–145)
SP GR UR STRIP.AUTO: 1.01 (ref 1–1.03)
SPECIMEN VOL UR: 1200 ML
UROBILINOGEN UR STRIP.AUTO-MCNC: <2 MG/DL

## 2019-03-26 PROCEDURE — 84520 ASSAY OF UREA NITROGEN: CPT

## 2019-03-26 PROCEDURE — 82565 ASSAY OF CREATININE: CPT

## 2019-03-26 PROCEDURE — 80051 ELECTROLYTE PANEL: CPT

## 2019-03-26 PROCEDURE — 36415 COLL VENOUS BLD VENIPUNCTURE: CPT

## 2019-03-26 PROCEDURE — 81003 URINALYSIS AUTO W/O SCOPE: CPT

## 2019-03-26 PROCEDURE — 82310 ASSAY OF CALCIUM: CPT

## 2019-03-26 PROCEDURE — 82947 ASSAY GLUCOSE BLOOD QUANT: CPT

## 2019-04-19 DIAGNOSIS — I10 ESSENTIAL HYPERTENSION: Primary | Chronic | ICD-10-CM

## 2019-04-22 RX ORDER — HYDRALAZINE HYDROCHLORIDE 50 MG/1
TABLET, FILM COATED ORAL
Qty: 60 TABLET | Refills: 2 | Status: SHIPPED | OUTPATIENT
Start: 2019-04-22 | End: 2019-07-15

## 2019-04-22 NOTE — TELEPHONE ENCOUNTER
Passed protocol    Medication(s) to Refill:   Requested Prescriptions     Pending Prescriptions Disp Refills   • HYDRALAZINE HCL 50 MG Oral Tab [Pharmacy Med Name: HYDRALAZINE 50MG    TAB] 60 tablet 2     Sig: TAKE 1 TABLET BY MOUTH TWICE DAILY       Last

## 2019-06-05 RX ORDER — FELODIPINE 5 MG/1
TABLET, EXTENDED RELEASE ORAL
Qty: 180 TABLET | Refills: 0 | Status: SHIPPED | OUTPATIENT
Start: 2019-06-05 | End: 2019-08-30

## 2019-06-11 RX ORDER — ATENOLOL 100 MG/1
TABLET ORAL
Qty: 90 TABLET | Refills: 0 | Status: SHIPPED | OUTPATIENT
Start: 2019-06-11 | End: 2019-09-09

## 2019-06-11 NOTE — TELEPHONE ENCOUNTER
Refill requested:   Requested Prescriptions     Pending Prescriptions Disp Refills   • ATENOLOL 100 MG Oral Tab [Pharmacy Med Name: ATENOLOL 100MG      TAB] 90 tablet 0     Sig: TAKE 1 TABLET BY MOUTH ONCE DAILY     Passed protocol    Last refill: 3-5-2019

## 2019-07-15 DIAGNOSIS — I10 ESSENTIAL HYPERTENSION: Chronic | ICD-10-CM

## 2019-07-16 RX ORDER — HYDRALAZINE HYDROCHLORIDE 50 MG/1
TABLET, FILM COATED ORAL
Qty: 60 TABLET | Refills: 2 | Status: SHIPPED | OUTPATIENT
Start: 2019-07-16 | End: 2019-10-17

## 2019-09-03 RX ORDER — FELODIPINE 5 MG/1
TABLET, EXTENDED RELEASE ORAL
Qty: 180 TABLET | Refills: 0 | Status: SHIPPED | OUTPATIENT
Start: 2019-09-03 | End: 2019-11-25

## 2019-09-03 NOTE — TELEPHONE ENCOUNTER
Passed protocol    Requesting FELODIPINE ER 5 MG Oral Tablet 24 Hr  LOV: 3/25/19  RTC: 4 weeks  Last Relevant Labs: 3/26/19  Filled: 6/5/19 #180 with 0 refills    Future appointments: None

## 2019-09-10 RX ORDER — ATENOLOL 100 MG/1
TABLET ORAL
Qty: 90 TABLET | Refills: 0 | Status: SHIPPED | OUTPATIENT
Start: 2019-09-10 | End: 2019-12-05

## 2019-10-17 ENCOUNTER — OFFICE VISIT (OUTPATIENT)
Dept: INTERNAL MEDICINE CLINIC | Facility: CLINIC | Age: 74
End: 2019-10-17
Payer: COMMERCIAL

## 2019-10-17 ENCOUNTER — LAB ENCOUNTER (OUTPATIENT)
Dept: LAB | Age: 74
End: 2019-10-17
Attending: INTERNAL MEDICINE
Payer: COMMERCIAL

## 2019-10-17 VITALS
BODY MASS INDEX: 32.58 KG/M2 | OXYGEN SATURATION: 99 % | HEIGHT: 69 IN | SYSTOLIC BLOOD PRESSURE: 122 MMHG | TEMPERATURE: 98 F | RESPIRATION RATE: 20 BRPM | WEIGHT: 220 LBS | HEART RATE: 68 BPM | DIASTOLIC BLOOD PRESSURE: 68 MMHG

## 2019-10-17 DIAGNOSIS — I10 ESSENTIAL HYPERTENSION: Chronic | ICD-10-CM

## 2019-10-17 DIAGNOSIS — E79.0 HYPERURICEMIA: Chronic | ICD-10-CM

## 2019-10-17 DIAGNOSIS — Z00.00 ROUTINE GENERAL MEDICAL EXAMINATION AT A HEALTH CARE FACILITY: ICD-10-CM

## 2019-10-17 DIAGNOSIS — E21.3 HYPERPARATHYROIDISM (HCC): ICD-10-CM

## 2019-10-17 DIAGNOSIS — Z00.00 ROUTINE GENERAL MEDICAL EXAMINATION AT A HEALTH CARE FACILITY: Primary | ICD-10-CM

## 2019-10-17 DIAGNOSIS — E55.9 VITAMIN D DEFICIENCY: ICD-10-CM

## 2019-10-17 PROCEDURE — 90662 IIV NO PRSV INCREASED AG IM: CPT | Performed by: INTERNAL MEDICINE

## 2019-10-17 PROCEDURE — 84550 ASSAY OF BLOOD/URIC ACID: CPT | Performed by: INTERNAL MEDICINE

## 2019-10-17 PROCEDURE — 99212 OFFICE O/P EST SF 10 MIN: CPT | Performed by: INTERNAL MEDICINE

## 2019-10-17 PROCEDURE — 82306 VITAMIN D 25 HYDROXY: CPT | Performed by: INTERNAL MEDICINE

## 2019-10-17 PROCEDURE — 36415 COLL VENOUS BLD VENIPUNCTURE: CPT | Performed by: INTERNAL MEDICINE

## 2019-10-17 PROCEDURE — 90472 IMMUNIZATION ADMIN EACH ADD: CPT | Performed by: INTERNAL MEDICINE

## 2019-10-17 PROCEDURE — 93000 ELECTROCARDIOGRAM COMPLETE: CPT | Performed by: INTERNAL MEDICINE

## 2019-10-17 PROCEDURE — 84153 ASSAY OF PSA TOTAL: CPT | Performed by: INTERNAL MEDICINE

## 2019-10-17 PROCEDURE — 80061 LIPID PANEL: CPT | Performed by: INTERNAL MEDICINE

## 2019-10-17 PROCEDURE — 80050 GENERAL HEALTH PANEL: CPT | Performed by: INTERNAL MEDICINE

## 2019-10-17 PROCEDURE — 90471 IMMUNIZATION ADMIN: CPT | Performed by: INTERNAL MEDICINE

## 2019-10-17 PROCEDURE — 90732 PPSV23 VACC 2 YRS+ SUBQ/IM: CPT | Performed by: INTERNAL MEDICINE

## 2019-10-17 PROCEDURE — G0439 PPPS, SUBSEQ VISIT: HCPCS | Performed by: INTERNAL MEDICINE

## 2019-10-17 PROCEDURE — 83036 HEMOGLOBIN GLYCOSYLATED A1C: CPT | Performed by: INTERNAL MEDICINE

## 2019-10-17 PROCEDURE — 84436 ASSAY OF TOTAL THYROXINE: CPT | Performed by: INTERNAL MEDICINE

## 2019-10-17 RX ORDER — HYDRALAZINE HYDROCHLORIDE 50 MG/1
50 TABLET, FILM COATED ORAL 2 TIMES DAILY
Qty: 60 TABLET | Refills: 2 | Status: SHIPPED | OUTPATIENT
Start: 2019-10-17 | End: 2020-01-13

## 2019-10-17 RX ORDER — ALLOPURINOL 100 MG/1
100 TABLET ORAL
Refills: 3 | COMMUNITY
Start: 2019-09-06 | End: 2020-03-07

## 2019-10-17 NOTE — PROGRESS NOTES
[address] [place] [address] 1945 is a 66year old male. Patient presents with:  Physical      HPI:   No cc  allopurinol 100 MG Oral Tab, Take 100 mg by mouth once daily. , Disp: , Rfl: 3  hydrALAzine HCl 50 MG Oral Tab, Take 1 tablet (50 mg total) by mo none. Exercise 3 times per week   Gastrointestinal:   Patient denies abdominal pain, acid reflux, blood in stool, vomiting, nausea, heartburn denies any wt loss or gain no change in appetite noted no change in bowel movement noted. Dysphagia none.    Hemato .   Chest Shape: normal .   Percussion: normal.   Rales: no .   Respiratory effort: normal .   Rhonchi: no.   Wheezes: no. ABDOMEN:   Bowel sounds: normal.   General: normal.   Hernia: absent.   Diastases of recti noted  Liver, Spleen: no hepatosplenomega PNEUMOCOCCAL IMM (PNEUMOVAX)  -     FLU VACC HIGH DOSE PRSV FREE       EKG shows normal sinus rhythm heart rate of 69 NM interval 196 ms  Await lab work prior to further recommendations.   Patient to follow-up with endocrinologist    The patient indicates u

## 2019-10-29 ENCOUNTER — OFFICE VISIT (OUTPATIENT)
Dept: SURGERY | Facility: CLINIC | Age: 74
End: 2019-10-29
Payer: COMMERCIAL

## 2019-10-29 VITALS — TEMPERATURE: 98 F | DIASTOLIC BLOOD PRESSURE: 83 MMHG | HEART RATE: 69 BPM | SYSTOLIC BLOOD PRESSURE: 138 MMHG

## 2019-10-29 DIAGNOSIS — R97.20 ELEVATED PSA: Primary | ICD-10-CM

## 2019-10-29 PROCEDURE — 99203 OFFICE O/P NEW LOW 30 MIN: CPT | Performed by: UROLOGY

## 2019-10-29 NOTE — PROGRESS NOTES
Rooming Clinician:     Sigrid Strong is a 66year old male. Patient presents with:  elevated psa        HPI:     Of 4.68 ng/mL. He has no significant voiding complaints. He has nocturia x2. Generally feels empty.   No family history of prostate ca no apparent distress  SKIN: no rashes,no suspicious lesions  HEENT: atraumatic, normocephalic,ears and throat are clear  NECK: supple  LUNGS: normal respiratory motion without distress  CARDIO: normal peripheral perfusion  ABDOMEN: no masses,  tenderness, cognitive biopsy based on MRI findings as well as steriotactic biopsy as well as  histologic tissue testing on previous biopsy tissue. We talked about the risks and complications of these procedure.   I reviewed guidelines for PSA screening from both [facility]

## 2019-11-26 RX ORDER — FELODIPINE 5 MG/1
5 TABLET, EXTENDED RELEASE ORAL 2 TIMES DAILY
Qty: 180 TABLET | Refills: 0 | Status: SHIPPED | OUTPATIENT
Start: 2019-11-26 | End: 2020-02-24

## 2019-11-26 NOTE — TELEPHONE ENCOUNTER
Passed protocol    Requesting FELODIPINE ER 5 MG Oral Tablet 24 Hr  LOV: 10/17/19  RTC: 6 months  Last Relevant Labs: 10/17/19  Filled: 9/3/19 #180 with 0 refills    Future Appointments   Date Time Provider Julito Barnhart   4/16/2020  9:45 AM Geneva Doe

## 2019-12-06 RX ORDER — ATENOLOL 100 MG/1
TABLET ORAL
Qty: 90 TABLET | Refills: 0 | Status: SHIPPED | OUTPATIENT
Start: 2019-12-06 | End: 2020-03-05

## 2019-12-06 NOTE — TELEPHONE ENCOUNTER
Passed protocol    Requesting ATENOLOL 100 MG Oral Tab  LOV: 10/17/19   RTC: 6 months  Last Relevant Labs: 10/17/19  Filled: 9/10/19 #90 with 0 refills    Future Appointments   Date Time Provider Julito Barnhart   4/16/2020  9:45 AM Teri Perez MD EM

## 2020-01-13 DIAGNOSIS — I10 ESSENTIAL HYPERTENSION: Chronic | ICD-10-CM

## 2020-01-13 RX ORDER — HYDRALAZINE HYDROCHLORIDE 50 MG/1
50 TABLET, FILM COATED ORAL 2 TIMES DAILY
Qty: 60 TABLET | Refills: 2 | Status: SHIPPED | OUTPATIENT
Start: 2020-01-13 | End: 2020-04-12

## 2020-01-13 NOTE — TELEPHONE ENCOUNTER
Passed protocol    Requesting hydrALAzine HCl 50 MG Oral Tab  LOV: 10/17/19  RTC: 6 months  Last Relevant Labs: 10/17/19  Filled: 10/17/19 #60 with 2 refills    Future Appointments   Date Time Provider Julito Barnhart   4/16/2020  9:45 AM Juan Phillips,

## 2020-02-27 RX ORDER — FELODIPINE 5 MG/1
5 TABLET, EXTENDED RELEASE ORAL 2 TIMES DAILY
Qty: 180 TABLET | Refills: 0 | Status: SHIPPED | OUTPATIENT
Start: 2020-02-27 | End: 2020-05-25

## 2020-02-27 NOTE — TELEPHONE ENCOUNTER
Passed protocol    Requesting Felodipine ER 5 MG Oral Tablet 24 Hr  LOV: 10/17/19  RTC: 6 months  Last Relevant Labs: 10/17/19  Filled: 11/26/19 #180 with 0 refills    Future Appointments   Date Time Provider Julito Barnhart   4/16/2020  9:45 AM [place]

## 2020-03-06 RX ORDER — ATENOLOL 100 MG/1
100 TABLET ORAL
Qty: 90 TABLET | Refills: 0 | Status: SHIPPED | OUTPATIENT
Start: 2020-03-06 | End: 2020-06-01

## 2020-03-06 NOTE — TELEPHONE ENCOUNTER
ATENOLOL 100 MG Oral Tab--passed protocol     Last OV relevant to medication:  10-    Last refill date: 12-6-2019 #90 tabs with 0 refills      When pt was asked to return for OV: 6 months     Upcoming appt/reason: 4/16/2020     Labs: 10-

## 2020-03-07 RX ORDER — ALLOPURINOL 100 MG/1
TABLET ORAL
Qty: 90 TABLET | Refills: 0 | Status: SHIPPED | OUTPATIENT
Start: 2020-03-07 | End: 2020-06-01

## 2020-03-07 NOTE — TELEPHONE ENCOUNTER
Allopurinol 100 MG Oral Tablet    Last OV relevant to medication: 10/17/2019  Last refill date: Historical   When pt was asked to return for OV: 6 months   Upcoming appt/reason: 4/16/2020

## 2020-04-12 DIAGNOSIS — I10 ESSENTIAL HYPERTENSION: Chronic | ICD-10-CM

## 2020-04-13 RX ORDER — HYDRALAZINE HYDROCHLORIDE 50 MG/1
50 TABLET, FILM COATED ORAL 2 TIMES DAILY
Qty: 180 TABLET | Refills: 0 | Status: SHIPPED | OUTPATIENT
Start: 2020-04-13 | End: 2020-07-08

## 2020-04-20 ENCOUNTER — APPOINTMENT (OUTPATIENT)
Dept: LAB | Age: 75
End: 2020-04-20
Attending: UROLOGY
Payer: MEDICARE

## 2020-04-20 PROCEDURE — 84153 ASSAY OF PSA TOTAL: CPT | Performed by: UROLOGY

## 2020-04-20 PROCEDURE — 36415 COLL VENOUS BLD VENIPUNCTURE: CPT | Performed by: UROLOGY

## 2020-04-21 ENCOUNTER — TELEPHONE (OUTPATIENT)
Dept: SURGERY | Facility: CLINIC | Age: 75
End: 2020-04-21

## 2020-04-21 NOTE — TELEPHONE ENCOUNTER
Phoned patient to relay MD's message and recommendations, \"Your recent PSA is normal.\"  Left message for call back. Reminded patient that he has an appointment on 4/28 Tuesday 130pm. RN offer a telephone call instead of an office visit.  Awaiting call

## 2020-04-22 ENCOUNTER — TELEPHONE (OUTPATIENT)
Dept: SURGERY | Facility: CLINIC | Age: 75
End: 2020-04-22

## 2020-04-22 NOTE — TELEPHONE ENCOUNTER
This encounter is already closed. Patient called 4/22 5911 and cancelled his 4/28 appt with Dr Raymon Deng and rescheduled it to 5/18/20 at 1130.

## 2020-05-18 ENCOUNTER — OFFICE VISIT (OUTPATIENT)
Dept: SURGERY | Facility: CLINIC | Age: 75
End: 2020-05-18
Payer: COMMERCIAL

## 2020-05-18 VITALS — SYSTOLIC BLOOD PRESSURE: 138 MMHG | TEMPERATURE: 98 F | DIASTOLIC BLOOD PRESSURE: 68 MMHG | HEART RATE: 82 BPM

## 2020-05-18 DIAGNOSIS — N40.0 ENLARGED PROSTATE WITHOUT LOWER URINARY TRACT SYMPTOMS (LUTS): Primary | ICD-10-CM

## 2020-05-18 PROCEDURE — 99213 OFFICE O/P EST LOW 20 MIN: CPT | Performed by: UROLOGY

## 2020-05-18 NOTE — PROGRESS NOTES
Rooming Clinician:     Jodee Gutierrez is a 66year old male. Patient presents with: Follow - Up: Follow up with PSA.  Last taken 4/20 3.94         HPI:     Patient returns for a follow-up examination to discuss recent repeat PSA which was 3.94 ng/mL 138/68 (BP Location: Right arm, Patient Position: Sitting, Cuff Size: large)   Pulse 82   Temp 98.1 °F (36.7 °C) (Oral)   GENERAL: well developed, well nourished,in no apparent distress  SKIN: no rashes,no suspicious lesions  HEENT: atraumatic, normocephal urology as needed. Diagnoses and all orders for this visit:    Enlarged prostate without lower urinary tract symptoms (luts)        Follow up examination as needed    The patient indicates understanding of these issues and agrees to the plan.     [place]

## 2020-05-26 RX ORDER — FELODIPINE 5 MG/1
5 TABLET, EXTENDED RELEASE ORAL 2 TIMES DAILY
Qty: 180 TABLET | Refills: 0 | Status: SHIPPED | OUTPATIENT
Start: 2020-05-26 | End: 2020-08-22

## 2020-06-02 RX ORDER — ATENOLOL 100 MG/1
100 TABLET ORAL
Qty: 90 TABLET | Refills: 0 | Status: SHIPPED | OUTPATIENT
Start: 2020-06-02 | End: 2020-09-02

## 2020-06-02 RX ORDER — ALLOPURINOL 100 MG/1
100 TABLET ORAL DAILY
Qty: 90 TABLET | Refills: 0 | Status: SHIPPED | OUTPATIENT
Start: 2020-06-02 | End: 2020-08-22

## 2020-06-02 NOTE — TELEPHONE ENCOUNTER
Refill requested:   Requested Prescriptions     Pending Prescriptions Disp Refills   • atenolol 100 MG Oral Tab 90 tablet 0     Sig: Take 1 tablet (100 mg total) by mouth once daily.    • allopurinol 100 MG Oral Tab 90 tablet 0     Sig: Take 1 tablet (100 m

## 2020-06-12 ENCOUNTER — APPOINTMENT (OUTPATIENT)
Dept: LAB | Age: 75
End: 2020-06-12
Attending: INTERNAL MEDICINE
Payer: COMMERCIAL

## 2020-06-12 ENCOUNTER — OFFICE VISIT (OUTPATIENT)
Dept: INTERNAL MEDICINE CLINIC | Facility: CLINIC | Age: 75
End: 2020-06-12
Payer: COMMERCIAL

## 2020-06-12 VITALS
OXYGEN SATURATION: 98 % | WEIGHT: 228 LBS | TEMPERATURE: 98 F | BODY MASS INDEX: 33.77 KG/M2 | SYSTOLIC BLOOD PRESSURE: 134 MMHG | RESPIRATION RATE: 16 BRPM | DIASTOLIC BLOOD PRESSURE: 76 MMHG | HEART RATE: 64 BPM | HEIGHT: 69 IN

## 2020-06-12 DIAGNOSIS — E79.0 HYPERURICEMIA: Primary | Chronic | ICD-10-CM

## 2020-06-12 DIAGNOSIS — I10 ESSENTIAL HYPERTENSION: Chronic | ICD-10-CM

## 2020-06-12 DIAGNOSIS — E79.0 HYPERURICEMIA: Chronic | ICD-10-CM

## 2020-06-12 DIAGNOSIS — R79.89 ELEVATED SERUM CREATININE: Chronic | ICD-10-CM

## 2020-06-12 PROCEDURE — 99213 OFFICE O/P EST LOW 20 MIN: CPT | Performed by: INTERNAL MEDICINE

## 2020-06-12 PROCEDURE — 84550 ASSAY OF BLOOD/URIC ACID: CPT | Performed by: INTERNAL MEDICINE

## 2020-06-12 PROCEDURE — 36415 COLL VENOUS BLD VENIPUNCTURE: CPT | Performed by: INTERNAL MEDICINE

## 2020-06-12 PROCEDURE — 80048 BASIC METABOLIC PNL TOTAL CA: CPT | Performed by: INTERNAL MEDICINE

## 2020-06-12 NOTE — PROGRESS NOTES
[address] [place] [address] 1945 is a 66year old male. Patient presents with:   Follow - Up       HPI:   Routine check no complaints  Current Outpatient Medications   Medication Sig Dispense Refill   • atenolol 100 MG Oral Tab Take 1 tablet (100 mg tot movement. Auscultation: no wheezing/rhonchi/rales. Breath sounds bilaterally: symmetrical.       ASSESSMENT AND PLAN:   Easton was seen today for follow - up.     Diagnoses and all orders for this visit:    Hyperuricemia  -     URIC ACID, SERUM; Futur

## 2020-07-08 DIAGNOSIS — I10 ESSENTIAL HYPERTENSION: Chronic | ICD-10-CM

## 2020-07-09 RX ORDER — HYDRALAZINE HYDROCHLORIDE 50 MG/1
50 TABLET, FILM COATED ORAL 2 TIMES DAILY
Qty: 180 TABLET | Refills: 0 | Status: SHIPPED | OUTPATIENT
Start: 2020-07-09 | End: 2020-10-10

## 2020-07-09 NOTE — TELEPHONE ENCOUNTER
hydrALAzine HCl 50 MG Oral Tab  Protocol Passed     LOV: 6/12/2020   RTC: 6 months   Upcoming OV: none scheduled   Filled: 4/13/2020 #180, 0 refills  Recent labs:  6/12/2020 BMP

## 2020-08-24 RX ORDER — ALLOPURINOL 100 MG/1
100 TABLET ORAL DAILY
Qty: 90 TABLET | Refills: 0 | Status: SHIPPED | OUTPATIENT
Start: 2020-08-24 | End: 2020-10-19

## 2020-08-24 RX ORDER — FELODIPINE 5 MG/1
5 TABLET, EXTENDED RELEASE ORAL 2 TIMES DAILY
Qty: 180 TABLET | Refills: 0 | Status: SHIPPED | OUTPATIENT
Start: 2020-08-24 | End: 2020-10-19

## 2020-08-24 NOTE — TELEPHONE ENCOUNTER
Requesting allopurinol 100 MG Oral Tab  LOV: 6/12/20  RTC: 6 months  Last Relevant Labs: 6/12/20  Filled: 6/2/20 #90 with 0 refills    No future appointments.

## 2020-08-24 NOTE — TELEPHONE ENCOUNTER
Passed protocol    Requesting Felodipine ER 5 MG Oral Tablet 24 Hr  LOV: 6/12/20  RTC: 6 months  Last Relevant Labs: 6/12/20  Filled: 5/26/20 #180 with 0 refills    No future appointments.

## 2020-09-03 RX ORDER — ATENOLOL 100 MG/1
100 TABLET ORAL
Qty: 90 TABLET | Refills: 0 | Status: SHIPPED | OUTPATIENT
Start: 2020-09-03 | End: 2020-12-06

## 2020-09-03 NOTE — TELEPHONE ENCOUNTER
atenolol 100 MG Oral Tab  Protocol Passed     LOV: 6/12/2020   RTC: 6 months   Upcoming OV: 10/19/2020   Filled: 6/2/2020 #90 0 refills  Recent labs:  BMP 6/12/2020    Alise Roberts MD  6/15/2020  3:50 PM      Reviewed results   Within acceptable limits

## 2020-09-29 ENCOUNTER — APPOINTMENT (OUTPATIENT)
Dept: LAB | Age: 75
End: 2020-09-29
Attending: INTERNAL MEDICINE
Payer: COMMERCIAL

## 2020-09-29 DIAGNOSIS — Z01.818 PRE-OP TESTING: ICD-10-CM

## 2020-10-02 PROBLEM — Z86.010 HISTORY OF ADENOMATOUS POLYP OF COLON: Status: ACTIVE | Noted: 2020-10-02

## 2020-10-02 PROBLEM — D12.4 BENIGN NEOPLASM OF DESCENDING COLON: Status: ACTIVE | Noted: 2020-10-02

## 2020-10-02 PROBLEM — D12.3 BENIGN NEOPLASM OF TRANSVERSE COLON: Status: ACTIVE | Noted: 2020-10-02

## 2020-10-02 PROBLEM — D12.5 BENIGN NEOPLASM OF SIGMOID COLON: Status: ACTIVE | Noted: 2020-10-02

## 2020-10-02 PROBLEM — Z86.0101 HISTORY OF ADENOMATOUS POLYP OF COLON: Status: ACTIVE | Noted: 2020-10-02

## 2020-10-10 DIAGNOSIS — I10 ESSENTIAL HYPERTENSION: Chronic | ICD-10-CM

## 2020-10-12 RX ORDER — HYDRALAZINE HYDROCHLORIDE 50 MG/1
50 TABLET, FILM COATED ORAL 2 TIMES DAILY
Qty: 180 TABLET | Refills: 0 | Status: SHIPPED | OUTPATIENT
Start: 2020-10-12 | End: 2021-01-08

## 2020-10-12 NOTE — TELEPHONE ENCOUNTER
Passed protocol    Requesting hydrALAzine HCl 50 MG Oral Tab  LOV: 6/12/20  RTC: 6 months  Last Relevant Labs: 6/12/20  Filled: 7/9/20 #180 with 0 refills    Future Appointments   Date Time Provider Julito Barnhart   10/19/2020 10:00 AM SHARI Jenkins

## 2020-10-19 ENCOUNTER — LAB ENCOUNTER (OUTPATIENT)
Dept: LAB | Age: 75
End: 2020-10-19
Attending: INTERNAL MEDICINE
Payer: COMMERCIAL

## 2020-10-19 ENCOUNTER — OFFICE VISIT (OUTPATIENT)
Dept: INTERNAL MEDICINE CLINIC | Facility: CLINIC | Age: 75
End: 2020-10-19
Payer: COMMERCIAL

## 2020-10-19 VITALS
RESPIRATION RATE: 16 BRPM | WEIGHT: 231.38 LBS | TEMPERATURE: 98 F | SYSTOLIC BLOOD PRESSURE: 128 MMHG | HEART RATE: 90 BPM | HEIGHT: 68.75 IN | BODY MASS INDEX: 34.27 KG/M2 | OXYGEN SATURATION: 96 % | DIASTOLIC BLOOD PRESSURE: 64 MMHG

## 2020-10-19 DIAGNOSIS — E55.9 VITAMIN D DEFICIENCY: Chronic | ICD-10-CM

## 2020-10-19 DIAGNOSIS — I10 ESSENTIAL HYPERTENSION: Chronic | ICD-10-CM

## 2020-10-19 DIAGNOSIS — E79.0 HYPERURICEMIA: Chronic | ICD-10-CM

## 2020-10-19 DIAGNOSIS — Z00.00 ROUTINE GENERAL MEDICAL EXAMINATION AT A HEALTH CARE FACILITY: ICD-10-CM

## 2020-10-19 DIAGNOSIS — E21.3 HYPERPARATHYROIDISM (HCC): Chronic | ICD-10-CM

## 2020-10-19 DIAGNOSIS — N28.1 KIDNEY CYSTS: Chronic | ICD-10-CM

## 2020-10-19 DIAGNOSIS — Z00.00 ROUTINE GENERAL MEDICAL EXAMINATION AT A HEALTH CARE FACILITY: Primary | ICD-10-CM

## 2020-10-19 PROCEDURE — 3074F SYST BP LT 130 MM HG: CPT | Performed by: INTERNAL MEDICINE

## 2020-10-19 PROCEDURE — 80061 LIPID PANEL: CPT

## 2020-10-19 PROCEDURE — 3008F BODY MASS INDEX DOCD: CPT | Performed by: INTERNAL MEDICINE

## 2020-10-19 PROCEDURE — 90471 IMMUNIZATION ADMIN: CPT | Performed by: INTERNAL MEDICINE

## 2020-10-19 PROCEDURE — 85025 COMPLETE CBC W/AUTO DIFF WBC: CPT

## 2020-10-19 PROCEDURE — 3078F DIAST BP <80 MM HG: CPT | Performed by: INTERNAL MEDICINE

## 2020-10-19 PROCEDURE — 84153 ASSAY OF PSA TOTAL: CPT

## 2020-10-19 PROCEDURE — 84436 ASSAY OF TOTAL THYROXINE: CPT

## 2020-10-19 PROCEDURE — 83036 HEMOGLOBIN GLYCOSYLATED A1C: CPT

## 2020-10-19 PROCEDURE — 90662 IIV NO PRSV INCREASED AG IM: CPT | Performed by: INTERNAL MEDICINE

## 2020-10-19 PROCEDURE — 84550 ASSAY OF BLOOD/URIC ACID: CPT

## 2020-10-19 PROCEDURE — 80053 COMPREHEN METABOLIC PANEL: CPT

## 2020-10-19 PROCEDURE — 84443 ASSAY THYROID STIM HORMONE: CPT

## 2020-10-19 PROCEDURE — 82306 VITAMIN D 25 HYDROXY: CPT

## 2020-10-19 PROCEDURE — 36415 COLL VENOUS BLD VENIPUNCTURE: CPT

## 2020-10-19 PROCEDURE — 81003 URINALYSIS AUTO W/O SCOPE: CPT

## 2020-10-19 PROCEDURE — 99397 PER PM REEVAL EST PAT 65+ YR: CPT | Performed by: INTERNAL MEDICINE

## 2020-10-19 PROCEDURE — 93000 ELECTROCARDIOGRAM COMPLETE: CPT | Performed by: INTERNAL MEDICINE

## 2020-10-19 RX ORDER — FELODIPINE 5 MG/1
5 TABLET, EXTENDED RELEASE ORAL 2 TIMES DAILY
Qty: 180 TABLET | Refills: 0 | Status: SHIPPED | OUTPATIENT
Start: 2020-10-19 | End: 2021-02-18

## 2020-10-19 RX ORDER — ALLOPURINOL 100 MG/1
100 TABLET ORAL DAILY
Qty: 90 TABLET | Refills: 0 | Status: SHIPPED | OUTPATIENT
Start: 2020-10-19 | End: 2021-02-24

## 2020-10-19 NOTE — PROGRESS NOTES
[address] [place] [address] 1945 is a 66year old male. Patient presents with:  Physical      HPI:   No cc  Current Outpatient Medications   Medication Sig Dispense Refill   • allopurinol 100 MG Oral Tab Take 1 tablet (100 mg total) by mouth daily.  [address] [place] [address] none. Orthopnea no. Palpitations none. PND (paroxsymal nocturnal dyspnea) none.  Exercise 3 times per week   Gastrointestinal:   Patient denies abdominal pain, acid reflux, blood in stool, vomiting, nausea, heartburn denies any wt loss or gain no change in sounds: normal.   Murmurs: none. Rhythm: regular. LUNGS:   Auscultation: clear . Chest Shape: normal .   Percussion: normal.   Rales: no .   Respiratory effort: normal .   Rhonchi: no.   Wheezes: no.    ABDOMEN:   Bowel sounds: normal.   General: norm Future    Vitamin D deficiency  -     VITAMIN D, 25-HYDROXY; Future    Other orders  -     FLU VACC HIGH DOSE PRSV FREE  -     allopurinol 100 MG Oral Tab; Take 1 tablet (100 mg total) by mouth daily.  -     Felodipine ER 5 MG Oral Tablet 24 Hr;  Take 1 tab

## 2020-10-27 ENCOUNTER — HOSPITAL ENCOUNTER (OUTPATIENT)
Dept: ULTRASOUND IMAGING | Age: 75
Discharge: HOME | End: 2020-10-27
Attending: INTERNAL MEDICINE
Payer: COMMERCIAL

## 2020-10-27 DIAGNOSIS — N28.1 KIDNEY CYSTS: Chronic | ICD-10-CM

## 2020-10-27 PROCEDURE — 76775 US EXAM ABDO BACK WALL LIM: CPT | Performed by: INTERNAL MEDICINE

## 2020-12-07 RX ORDER — ATENOLOL 100 MG/1
100 TABLET ORAL
Qty: 90 TABLET | Refills: 0 | Status: SHIPPED | OUTPATIENT
Start: 2020-12-07 | End: 2021-02-25

## 2020-12-07 NOTE — TELEPHONE ENCOUNTER
atenolol 100 MG Oral Tab          Sig: Take 1 tablet (100 mg total) by mouth once daily.     Disp:  90 tablet    Refills:  0    Start: 12/6/2020    Class: Normal    Non-formulary    Last ordered: 3 months ago by David Daniels MD     Hypertension Medication

## 2021-01-08 DIAGNOSIS — I10 ESSENTIAL HYPERTENSION: Chronic | ICD-10-CM

## 2021-01-08 RX ORDER — HYDRALAZINE HYDROCHLORIDE 50 MG/1
50 TABLET, FILM COATED ORAL 2 TIMES DAILY
Qty: 180 TABLET | Refills: 0 | Status: SHIPPED | OUTPATIENT
Start: 2021-01-08 | End: 2021-04-08

## 2021-01-08 NOTE — TELEPHONE ENCOUNTER
Passed protocol   Requesting hydrALAzine HCl 50 MG Oral Tab  LOV: 10/19/2020  RTC: 6 months   Last Relevant Labs: 10/19/2020  Filled: 10/12/2020 #180 with 0 refills    Future Appointments   Date Time Provider Julito Barnhart   4/20/2021  9:30 AM [place]

## 2021-02-19 RX ORDER — FELODIPINE 5 MG/1
5 TABLET, EXTENDED RELEASE ORAL 2 TIMES DAILY
Qty: 180 TABLET | Refills: 0 | Status: SHIPPED | OUTPATIENT
Start: 2021-02-19 | End: 2021-05-24

## 2021-02-24 RX ORDER — ATENOLOL 100 MG/1
100 TABLET ORAL
Qty: 90 TABLET | Refills: 0 | Status: CANCELLED | OUTPATIENT
Start: 2021-02-24

## 2021-02-25 RX ORDER — ATENOLOL 100 MG/1
100 TABLET ORAL
Qty: 90 TABLET | Refills: 0 | Status: SHIPPED | OUTPATIENT
Start: 2021-02-25 | End: 2021-05-30

## 2021-02-25 RX ORDER — ALLOPURINOL 100 MG/1
100 TABLET ORAL DAILY
Qty: 90 TABLET | Refills: 0 | Status: SHIPPED | OUTPATIENT
Start: 2021-02-25 | End: 2021-05-30

## 2021-02-25 NOTE — TELEPHONE ENCOUNTER
Medication(s) to Refill:   Requested Prescriptions     Pending Prescriptions Disp Refills   • allopurinol 100 MG Oral Tab 90 tablet 0     Sig: Take 1 tablet (100 mg total) by mouth daily.    • atenolol 100 MG Oral Tab 90 tablet 0     Sig: Take 1 tablet (100

## 2021-03-04 DIAGNOSIS — Z23 NEED FOR VACCINATION: ICD-10-CM

## 2021-03-08 ENCOUNTER — IMMUNIZATION (OUTPATIENT)
Dept: LAB | Age: 76
End: 2021-03-08
Attending: HOSPITALIST
Payer: COMMERCIAL

## 2021-03-08 DIAGNOSIS — Z23 NEED FOR VACCINATION: Primary | ICD-10-CM

## 2021-03-08 PROCEDURE — 0001A SARSCOV2 VAC 30MCG/0.3ML IM: CPT

## 2021-03-29 ENCOUNTER — IMMUNIZATION (OUTPATIENT)
Dept: LAB | Age: 76
End: 2021-03-29
Attending: HOSPITALIST
Payer: COMMERCIAL

## 2021-03-29 DIAGNOSIS — Z23 NEED FOR VACCINATION: Primary | ICD-10-CM

## 2021-03-29 PROCEDURE — 0002A SARSCOV2 VAC 30MCG/0.3ML IM: CPT

## 2021-04-08 DIAGNOSIS — I10 ESSENTIAL HYPERTENSION: Chronic | ICD-10-CM

## 2021-04-08 RX ORDER — HYDRALAZINE HYDROCHLORIDE 50 MG/1
50 TABLET, FILM COATED ORAL 2 TIMES DAILY
Qty: 180 TABLET | Refills: 0 | Status: SHIPPED | OUTPATIENT
Start: 2021-04-08 | End: 2021-07-10

## 2021-04-29 ENCOUNTER — LAB ENCOUNTER (OUTPATIENT)
Dept: LAB | Age: 76
End: 2021-04-29
Attending: INTERNAL MEDICINE
Payer: COMMERCIAL

## 2021-04-29 ENCOUNTER — OFFICE VISIT (OUTPATIENT)
Dept: INTERNAL MEDICINE CLINIC | Facility: CLINIC | Age: 76
End: 2021-04-29
Payer: COMMERCIAL

## 2021-04-29 VITALS
RESPIRATION RATE: 16 BRPM | HEART RATE: 70 BPM | SYSTOLIC BLOOD PRESSURE: 124 MMHG | TEMPERATURE: 98 F | DIASTOLIC BLOOD PRESSURE: 62 MMHG | OXYGEN SATURATION: 98 % | HEIGHT: 68.75 IN | BODY MASS INDEX: 35.08 KG/M2 | WEIGHT: 236.81 LBS

## 2021-04-29 DIAGNOSIS — I10 ESSENTIAL HYPERTENSION: Primary | ICD-10-CM

## 2021-04-29 DIAGNOSIS — R79.89 ELEVATED SERUM CREATININE: ICD-10-CM

## 2021-04-29 DIAGNOSIS — I10 ESSENTIAL HYPERTENSION: ICD-10-CM

## 2021-04-29 PROCEDURE — 83036 HEMOGLOBIN GLYCOSYLATED A1C: CPT

## 2021-04-29 PROCEDURE — 99213 OFFICE O/P EST LOW 20 MIN: CPT | Performed by: INTERNAL MEDICINE

## 2021-04-29 PROCEDURE — 3078F DIAST BP <80 MM HG: CPT | Performed by: INTERNAL MEDICINE

## 2021-04-29 PROCEDURE — 80061 LIPID PANEL: CPT

## 2021-04-29 PROCEDURE — 3074F SYST BP LT 130 MM HG: CPT | Performed by: INTERNAL MEDICINE

## 2021-04-29 PROCEDURE — 80053 COMPREHEN METABOLIC PANEL: CPT

## 2021-04-29 PROCEDURE — 36415 COLL VENOUS BLD VENIPUNCTURE: CPT

## 2021-04-29 PROCEDURE — 3008F BODY MASS INDEX DOCD: CPT | Performed by: INTERNAL MEDICINE

## 2021-04-29 NOTE — PROGRESS NOTES
[address] [place] [address] 1945 is a 66year old male. Patient presents with:   Follow - Up       HPI:   Routine check no complaints  Current Outpatient Medications   Medication Sig Dispense Refill   • hydrALAzine HCl 50 MG Oral Tab Take 1 tablet (50 m S1S2.   Murmurs: none. Rhythm: regular. LUNGS:   Airflow: normal air movement. Auscultation: no wheezing/rhonchi/rales. Breath sounds bilaterally: symmetrical.       ASSESSMENT AND PLAN:   Easton was seen today for follow - up.     Diagnoses and a

## 2021-05-25 RX ORDER — FELODIPINE 5 MG/1
5 TABLET, EXTENDED RELEASE ORAL 2 TIMES DAILY
Qty: 180 TABLET | Refills: 0 | Status: SHIPPED | OUTPATIENT
Start: 2021-05-25 | End: 2021-08-21

## 2021-05-25 NOTE — TELEPHONE ENCOUNTER
Protocol passed   Medication(s) to Refill:   Requested Prescriptions     Pending Prescriptions Disp Refills   • Felodipine ER 5 MG Oral Tablet 24 Hr 180 tablet 0     Sig: Take 1 tablet (5 mg total) by mouth 2 (two) times daily.        LOV: 4/29/2021   RTC:

## 2021-06-01 RX ORDER — ATENOLOL 100 MG/1
100 TABLET ORAL
Qty: 90 TABLET | Refills: 0 | Status: SHIPPED | OUTPATIENT
Start: 2021-06-01 | End: 2021-08-29

## 2021-06-01 RX ORDER — ALLOPURINOL 100 MG/1
100 TABLET ORAL DAILY
Qty: 90 TABLET | Refills: 0 | Status: SHIPPED | OUTPATIENT
Start: 2021-06-01 | End: 2021-08-29

## 2021-07-10 DIAGNOSIS — I10 ESSENTIAL HYPERTENSION: Chronic | ICD-10-CM

## 2021-07-12 RX ORDER — HYDRALAZINE HYDROCHLORIDE 50 MG/1
50 TABLET, FILM COATED ORAL 2 TIMES DAILY
Qty: 180 TABLET | Refills: 0 | Status: SHIPPED | OUTPATIENT
Start: 2021-07-12 | End: 2021-10-08

## 2021-07-12 NOTE — TELEPHONE ENCOUNTER
Medication(s) to Refill:   Requested Prescriptions     Pending Prescriptions Disp Refills   • hydrALAzine HCl 50 MG Oral Tab 180 tablet 0     Sig: Take 1 tablet (50 mg total) by mouth 2 (two) times daily.        LOV:  4-    RTC:  6 months      Last R

## 2021-08-23 RX ORDER — FELODIPINE 5 MG/1
5 TABLET, EXTENDED RELEASE ORAL 2 TIMES DAILY
Qty: 180 TABLET | Refills: 0 | Status: SHIPPED | OUTPATIENT
Start: 2021-08-23 | End: 2021-11-21

## 2021-08-30 RX ORDER — ATENOLOL 100 MG/1
100 TABLET ORAL
Qty: 90 TABLET | Refills: 0 | Status: SHIPPED | OUTPATIENT
Start: 2021-08-30 | End: 2021-11-26

## 2021-08-30 NOTE — TELEPHONE ENCOUNTER
Protocol passed     Requesting: atenolol 100mg     LOV: 4/29/21   RTC: 6 months   Filled: 6/1/21 #90 0 refills   Recent Labs: 4/29/21     Upcoming OV: none scheduled

## 2021-08-31 RX ORDER — ALLOPURINOL 100 MG/1
100 TABLET ORAL DAILY
Qty: 90 TABLET | Refills: 0 | Status: SHIPPED | OUTPATIENT
Start: 2021-08-31 | End: 2021-11-26

## 2021-10-08 DIAGNOSIS — I10 ESSENTIAL HYPERTENSION: Chronic | ICD-10-CM

## 2021-10-11 RX ORDER — HYDRALAZINE HYDROCHLORIDE 50 MG/1
50 TABLET, FILM COATED ORAL 2 TIMES DAILY
Qty: 180 TABLET | Refills: 0 | Status: SHIPPED | OUTPATIENT
Start: 2021-10-11 | End: 2022-01-06

## 2021-10-11 NOTE — TELEPHONE ENCOUNTER
Protocol passed     Requesting: hydralazine 50mg     LOV:4/29/21   RTC: 6 months   Filled: 7/12/21 #180 0 refills   Recent Labs: 4/29/21     Upcoming OV: 11/16/21

## 2021-11-23 RX ORDER — FELODIPINE 5 MG/1
5 TABLET, EXTENDED RELEASE ORAL 2 TIMES DAILY
Qty: 180 TABLET | Refills: 0 | Status: SHIPPED | OUTPATIENT
Start: 2021-11-23

## 2021-11-29 RX ORDER — ATENOLOL 100 MG/1
100 TABLET ORAL
Qty: 90 TABLET | Refills: 0 | Status: SHIPPED | OUTPATIENT
Start: 2021-11-29

## 2021-11-29 RX ORDER — ALLOPURINOL 100 MG/1
100 TABLET ORAL DAILY
Qty: 90 TABLET | Refills: 0 | Status: SHIPPED | OUTPATIENT
Start: 2021-11-29

## 2021-11-29 NOTE — TELEPHONE ENCOUNTER
Medication(s) to Refill:   Requested Prescriptions     Pending Prescriptions Disp Refills   • atenolol 100 MG Oral Tab 90 tablet 0     Sig: Take 1 tablet (100 mg total) by mouth once daily.        LOV:  4-    RTC: 6 months      Last Refill: 8-

## 2021-12-03 ENCOUNTER — OFFICE VISIT (OUTPATIENT)
Dept: INTERNAL MEDICINE CLINIC | Facility: CLINIC | Age: 76
End: 2021-12-03
Payer: COMMERCIAL

## 2021-12-03 ENCOUNTER — LAB ENCOUNTER (OUTPATIENT)
Dept: LAB | Age: 76
End: 2021-12-03
Attending: INTERNAL MEDICINE
Payer: COMMERCIAL

## 2021-12-03 VITALS
HEART RATE: 80 BPM | SYSTOLIC BLOOD PRESSURE: 136 MMHG | RESPIRATION RATE: 18 BRPM | DIASTOLIC BLOOD PRESSURE: 72 MMHG | BODY MASS INDEX: 34.36 KG/M2 | WEIGHT: 232 LBS | HEIGHT: 68.75 IN | OXYGEN SATURATION: 99 % | TEMPERATURE: 97 F

## 2021-12-03 DIAGNOSIS — Z00.00 ROUTINE GENERAL MEDICAL EXAMINATION AT A HEALTH CARE FACILITY: Primary | ICD-10-CM

## 2021-12-03 DIAGNOSIS — E79.0 HYPERURICEMIA: Chronic | ICD-10-CM

## 2021-12-03 DIAGNOSIS — Z00.00 ROUTINE GENERAL MEDICAL EXAMINATION AT A HEALTH CARE FACILITY: ICD-10-CM

## 2021-12-03 DIAGNOSIS — E55.9 VITAMIN D DEFICIENCY: ICD-10-CM

## 2021-12-03 DIAGNOSIS — I10 PRIMARY HYPERTENSION: ICD-10-CM

## 2021-12-03 DIAGNOSIS — E55.9 VITAMIN D DEFICIENCY: Chronic | ICD-10-CM

## 2021-12-03 PROCEDURE — 3008F BODY MASS INDEX DOCD: CPT | Performed by: INTERNAL MEDICINE

## 2021-12-03 PROCEDURE — 82306 VITAMIN D 25 HYDROXY: CPT

## 2021-12-03 PROCEDURE — 83036 HEMOGLOBIN GLYCOSYLATED A1C: CPT

## 2021-12-03 PROCEDURE — 3075F SYST BP GE 130 - 139MM HG: CPT | Performed by: INTERNAL MEDICINE

## 2021-12-03 PROCEDURE — 80053 COMPREHEN METABOLIC PANEL: CPT

## 2021-12-03 PROCEDURE — 99397 PER PM REEVAL EST PAT 65+ YR: CPT | Performed by: INTERNAL MEDICINE

## 2021-12-03 PROCEDURE — 85025 COMPLETE CBC W/AUTO DIFF WBC: CPT

## 2021-12-03 PROCEDURE — 84550 ASSAY OF BLOOD/URIC ACID: CPT

## 2021-12-03 PROCEDURE — 84436 ASSAY OF TOTAL THYROXINE: CPT

## 2021-12-03 PROCEDURE — 81003 URINALYSIS AUTO W/O SCOPE: CPT

## 2021-12-03 PROCEDURE — 36415 COLL VENOUS BLD VENIPUNCTURE: CPT

## 2021-12-03 PROCEDURE — 93000 ELECTROCARDIOGRAM COMPLETE: CPT | Performed by: INTERNAL MEDICINE

## 2021-12-03 PROCEDURE — 80061 LIPID PANEL: CPT

## 2021-12-03 PROCEDURE — 84443 ASSAY THYROID STIM HORMONE: CPT

## 2021-12-03 PROCEDURE — 3078F DIAST BP <80 MM HG: CPT | Performed by: INTERNAL MEDICINE

## 2021-12-03 NOTE — PROGRESS NOTES
[address] [facility] 1945 is a 64year old male. Patient presents with:  CPX      HPI:   No cc  Current Outpatient Medications   Medication Sig Dispense Refill   • atenolol 100 MG Oral Tab Take 1 tablet (100 mg total) by mouth once daily.  90 tab fever,heart murmur. . Leg edema none. Orthopnea no. Palpitations none. PND (paroxsymal nocturnal dyspnea) none.  Exercise 3 times per week   Gastrointestinal:   Patient denies abdominal pain, acid reflux, blood in stool, vomiting, nausea, heartburn denies an unremarkable. HEART:   Clicks: no.   Edema: none visible . Heart sounds: normal.   Murmurs: none. Rhythm: regular. LUNGS:   Auscultation: clear .    Chest Shape: normal .   Percussion: normal.   Rales: no .   Respiratory effort: normal .   Rhonchi: shows a heart rate of 76 AK interval 200 and Lisek QRS duration 78 ms otherwise unremarkable EKG. The patient indicates understanding of these issues and agrees to the plan. The patient is asked to Return in about 6 months (around 6/3/2022).   .      Vi

## 2022-01-06 DIAGNOSIS — I10 ESSENTIAL HYPERTENSION: Chronic | ICD-10-CM

## 2022-01-07 RX ORDER — HYDRALAZINE HYDROCHLORIDE 50 MG/1
50 TABLET, FILM COATED ORAL 2 TIMES DAILY
Qty: 180 TABLET | Refills: 0 | Status: SHIPPED | OUTPATIENT
Start: 2022-01-07

## 2022-02-21 RX ORDER — FELODIPINE 5 MG/1
5 TABLET, EXTENDED RELEASE ORAL 2 TIMES DAILY
Qty: 180 TABLET | Refills: 0 | Status: SHIPPED | OUTPATIENT
Start: 2022-02-21

## 2022-02-25 RX ORDER — ALLOPURINOL 100 MG/1
100 TABLET ORAL DAILY
Qty: 90 TABLET | Refills: 0 | Status: SHIPPED | OUTPATIENT
Start: 2022-02-25

## 2022-02-25 RX ORDER — ATENOLOL 100 MG/1
100 TABLET ORAL
Qty: 90 TABLET | Refills: 0 | Status: SHIPPED | OUTPATIENT
Start: 2022-02-25

## 2022-02-25 NOTE — TELEPHONE ENCOUNTER
No Protocol     Requesting: allopurinol 100mg     LOV: 12/3/21   RTC: 6 months   Filled: 11/29/21 #90 0 refills   Recent Labs: 12/3/21     Upcoming OV: none scheduled

## 2022-04-11 RX ORDER — HYDRALAZINE HYDROCHLORIDE 50 MG/1
50 TABLET, FILM COATED ORAL 2 TIMES DAILY
Qty: 180 TABLET | Refills: 0 | Status: SHIPPED | OUTPATIENT
Start: 2022-04-11

## 2022-04-11 NOTE — TELEPHONE ENCOUNTER
Protocol passed     Requesting: hydralazine 50mg     LOV: 12/3/21   RTC: 6 months   Filled: 1/7/22 #180 0 refills   Recent Labs: 12/3/21     Upcoming OV: none scheduled

## 2022-05-09 ENCOUNTER — LAB ENCOUNTER (OUTPATIENT)
Dept: LAB | Age: 77
End: 2022-05-09
Attending: INTERNAL MEDICINE
Payer: COMMERCIAL

## 2022-05-09 DIAGNOSIS — Z01.818 PRE-OP TESTING: ICD-10-CM

## 2022-05-10 LAB — SARS-COV-2 RNA RESP QL NAA+PROBE: NOT DETECTED

## 2022-05-11 PROBLEM — D12.0 BENIGN NEOPLASM OF CECUM: Status: ACTIVE | Noted: 2022-05-11

## 2022-05-11 PROBLEM — Z86.0101 HISTORY OF ADENOMATOUS POLYP OF COLON: Status: ACTIVE | Noted: 2022-05-11

## 2022-05-11 PROBLEM — Z86.010 HISTORY OF ADENOMATOUS POLYP OF COLON: Status: ACTIVE | Noted: 2022-05-11

## 2022-05-22 RX ORDER — ATENOLOL 100 MG/1
100 TABLET ORAL
Qty: 90 TABLET | Refills: 0 | Status: CANCELLED | OUTPATIENT
Start: 2022-05-22

## 2022-05-23 RX ORDER — ALLOPURINOL 100 MG/1
100 TABLET ORAL DAILY
Qty: 90 TABLET | Refills: 0 | Status: SHIPPED | OUTPATIENT
Start: 2022-05-23

## 2022-05-23 RX ORDER — ATENOLOL 100 MG/1
100 TABLET ORAL
Qty: 90 TABLET | Refills: 0 | Status: SHIPPED | OUTPATIENT
Start: 2022-05-23

## 2022-05-23 RX ORDER — ATENOLOL 100 MG/1
100 TABLET ORAL
Qty: 90 TABLET | Refills: 0 | OUTPATIENT
Start: 2022-05-23

## 2022-05-23 RX ORDER — FELODIPINE 5 MG/1
5 TABLET, EXTENDED RELEASE ORAL 2 TIMES DAILY
Qty: 180 TABLET | Refills: 0 | OUTPATIENT
Start: 2022-05-23

## 2022-05-23 RX ORDER — FELODIPINE 5 MG/1
5 TABLET, EXTENDED RELEASE ORAL 2 TIMES DAILY
Qty: 180 TABLET | Refills: 0 | Status: SHIPPED | OUTPATIENT
Start: 2022-05-23

## 2022-07-10 DIAGNOSIS — I10 ESSENTIAL HYPERTENSION: Chronic | ICD-10-CM

## 2022-07-11 RX ORDER — HYDRALAZINE HYDROCHLORIDE 50 MG/1
50 TABLET, FILM COATED ORAL 2 TIMES DAILY
Qty: 180 TABLET | Refills: 0 | Status: SHIPPED | OUTPATIENT
Start: 2022-07-11

## 2022-07-11 NOTE — TELEPHONE ENCOUNTER
Protocol passed     Requesting: hydralazine 50mg   LOV: 12/3/21   RTC: 6 months   Filled: 4/11/22 #180 0 refills   Recent Labs: 12/3/21     Upcoming OV: 7/12/22

## 2022-07-12 ENCOUNTER — OFFICE VISIT (OUTPATIENT)
Dept: INTERNAL MEDICINE CLINIC | Facility: CLINIC | Age: 77
End: 2022-07-12
Payer: COMMERCIAL

## 2022-07-12 ENCOUNTER — LAB ENCOUNTER (OUTPATIENT)
Dept: LAB | Age: 77
End: 2022-07-12
Attending: INTERNAL MEDICINE
Payer: COMMERCIAL

## 2022-07-12 VITALS
DIASTOLIC BLOOD PRESSURE: 60 MMHG | TEMPERATURE: 98 F | HEART RATE: 69 BPM | RESPIRATION RATE: 16 BRPM | SYSTOLIC BLOOD PRESSURE: 128 MMHG | BODY MASS INDEX: 34.18 KG/M2 | OXYGEN SATURATION: 98 % | HEIGHT: 68.75 IN | WEIGHT: 230.81 LBS

## 2022-07-12 DIAGNOSIS — R79.89 ELEVATED SERUM CREATININE: ICD-10-CM

## 2022-07-12 DIAGNOSIS — R73.03 PREDIABETES: ICD-10-CM

## 2022-07-12 DIAGNOSIS — I10 PRIMARY HYPERTENSION: Primary | ICD-10-CM

## 2022-07-12 PROBLEM — D12.3 BENIGN NEOPLASM OF TRANSVERSE COLON: Chronic | Status: ACTIVE | Noted: 2020-10-02

## 2022-07-12 PROBLEM — Z86.0101 HISTORY OF ADENOMATOUS POLYP OF COLON: Chronic | Status: ACTIVE | Noted: 2022-05-11

## 2022-07-12 PROBLEM — D12.0 BENIGN NEOPLASM OF CECUM: Chronic | Status: ACTIVE | Noted: 2022-05-11

## 2022-07-12 PROBLEM — R97.20 ELEVATED PSA: Chronic | Status: ACTIVE | Noted: 2018-10-08

## 2022-07-12 PROBLEM — D12.5 BENIGN NEOPLASM OF SIGMOID COLON: Chronic | Status: ACTIVE | Noted: 2020-10-02

## 2022-07-12 PROBLEM — D12.4 BENIGN NEOPLASM OF DESCENDING COLON: Chronic | Status: ACTIVE | Noted: 2020-10-02

## 2022-07-12 PROBLEM — Z86.010 HISTORY OF ADENOMATOUS POLYP OF COLON: Chronic | Status: ACTIVE | Noted: 2022-05-11

## 2022-07-12 LAB
ANION GAP SERPL CALC-SCNC: 7 MMOL/L (ref 0–18)
BUN BLD-MCNC: 13 MG/DL (ref 7–18)
CALCIUM BLD-MCNC: 9 MG/DL (ref 8.5–10.1)
CHLORIDE SERPL-SCNC: 107 MMOL/L (ref 98–112)
CO2 SERPL-SCNC: 26 MMOL/L (ref 21–32)
CREAT BLD-MCNC: 1.47 MG/DL
EST. AVERAGE GLUCOSE BLD GHB EST-MCNC: 126 MG/DL (ref 68–126)
FASTING STATUS PATIENT QL REPORTED: YES
GLUCOSE BLD-MCNC: 113 MG/DL (ref 70–99)
HBA1C MFR BLD: 6 % (ref ?–5.7)
OSMOLALITY SERPL CALC.SUM OF ELEC: 291 MOSM/KG (ref 275–295)
POTASSIUM SERPL-SCNC: 4.2 MMOL/L (ref 3.5–5.1)
SODIUM SERPL-SCNC: 140 MMOL/L (ref 136–145)

## 2022-07-12 PROCEDURE — 3074F SYST BP LT 130 MM HG: CPT | Performed by: INTERNAL MEDICINE

## 2022-07-12 PROCEDURE — 80048 BASIC METABOLIC PNL TOTAL CA: CPT | Performed by: INTERNAL MEDICINE

## 2022-07-12 PROCEDURE — 99213 OFFICE O/P EST LOW 20 MIN: CPT | Performed by: INTERNAL MEDICINE

## 2022-07-12 PROCEDURE — 83036 HEMOGLOBIN GLYCOSYLATED A1C: CPT | Performed by: INTERNAL MEDICINE

## 2022-07-12 PROCEDURE — 3008F BODY MASS INDEX DOCD: CPT | Performed by: INTERNAL MEDICINE

## 2022-07-12 PROCEDURE — 3078F DIAST BP <80 MM HG: CPT | Performed by: INTERNAL MEDICINE

## 2022-08-11 ENCOUNTER — GENETICS ENCOUNTER (OUTPATIENT)
Dept: GENETICS | Facility: HOSPITAL | Age: 77
End: 2022-08-11
Attending: GENETIC COUNSELOR, MS
Payer: MEDICARE

## 2022-08-11 ENCOUNTER — NURSE ONLY (OUTPATIENT)
Dept: HEMATOLOGY/ONCOLOGY | Facility: HOSPITAL | Age: 77
End: 2022-08-11
Attending: GENETIC COUNSELOR, MS
Payer: MEDICARE

## 2022-08-11 DIAGNOSIS — Z86.010 HISTORY OF ADENOMATOUS POLYP OF COLON: Primary | ICD-10-CM

## 2022-08-11 PROCEDURE — 36415 COLL VENOUS BLD VENIPUNCTURE: CPT

## 2022-08-11 PROCEDURE — 96040 HC GENETIC COUNSELING EA 30 MIN: CPT | Performed by: GENETIC COUNSELOR, MS

## 2022-08-11 PROCEDURE — 99211 OFF/OP EST MAY X REQ PHY/QHP: CPT

## 2022-08-22 RX ORDER — FELODIPINE 5 MG/1
5 TABLET, EXTENDED RELEASE ORAL 2 TIMES DAILY
Qty: 180 TABLET | Refills: 0 | Status: SHIPPED | OUTPATIENT
Start: 2022-08-22

## 2022-08-22 NOTE — TELEPHONE ENCOUNTER
Protocol passed     Requesting: felodipine 5mg      LOV: 7/12/22   RTC: 6 months   Filled: 5/23/22 #180 0 refills   Recent Labs: 7/12/22     Upcoming OV: none scheduled

## 2022-08-23 RX ORDER — ALLOPURINOL 100 MG/1
100 TABLET ORAL DAILY
Qty: 90 TABLET | Refills: 0 | OUTPATIENT
Start: 2022-08-23

## 2022-08-23 RX ORDER — ATENOLOL 100 MG/1
100 TABLET ORAL
Qty: 90 TABLET | Refills: 0 | Status: SHIPPED | OUTPATIENT
Start: 2022-08-23

## 2022-08-26 ENCOUNTER — GENETICS ENCOUNTER (OUTPATIENT)
Dept: HEMATOLOGY/ONCOLOGY | Facility: HOSPITAL | Age: 77
End: 2022-08-26

## 2022-08-26 NOTE — PROGRESS NOTES
Referring Provider:                   Roddy Hernandez MD     Additional Provider(s):              Jatinder Prescott MD     Reason for Referral:  Izabel Quispe had genetic testing performed on 8/11/22 because of a personal history of 12 adenomatous colon polyps. Genetic Testing Result:  NEGATIVE - No known pathogenic variants were found in 30 genes including: APC, LIDIA, AXIN2, BLM, BMPR1A, BUB1B, CDH1, CEP57, CHEK2, ENG, FLCN, GALNT12, GREM1 (promoter region deletion/duplication testing only), MLH1, MSH2 (including EPCAM rearrangement testing), MSH3, MSH6, MUTYH, NTHL1 (sequence analysis only), PMS2, POLD1, POLE, PTEN, RNF43, RPS20, SMAD4, STK11, and TP53. Please refer to the report from [facility] for additional testing information. These results were discussed with Mr. Jayne Hensley by phone on 8/25/22. Summary and Plan:  These results indicate that it is unlikely that Mr. Jayne Hensley has a pathogenic variant in any of the genes listed above. The limitations of the testing include the chance that a pathogenic variant in a gene other than those included in this analysis might be the cause of colon polyps in Mr. Jayne Hensley. I encourage Mr. Jayne Hensley to contact me on an annual basis to see if there have been any updates in genetic testing that would apply to him or to inform me if there are any changes to the family history. In the meantime, Mr. Jayne Hensley and his relatives should speak with their physicians to discuss recommended medical management according to their personal and family history.     Cc:  Izabel Quispe

## 2022-08-29 RX ORDER — ALLOPURINOL 100 MG/1
100 TABLET ORAL DAILY
Qty: 90 TABLET | Refills: 0 | Status: SHIPPED | OUTPATIENT
Start: 2022-08-29

## 2022-08-29 NOTE — TELEPHONE ENCOUNTER
No Protocol     Requesting: allopurinol 100mg     LOV: 7/12/22   RTC: 6 months   Filled: 5/23/22 #90 0 refills   Recent Labs: 12/3/21     Upcoming OV none scheduled

## 2022-10-05 DIAGNOSIS — I10 ESSENTIAL HYPERTENSION: Chronic | ICD-10-CM

## 2022-10-06 RX ORDER — HYDRALAZINE HYDROCHLORIDE 50 MG/1
50 TABLET, FILM COATED ORAL 2 TIMES DAILY
Qty: 180 TABLET | Refills: 0 | Status: SHIPPED | OUTPATIENT
Start: 2022-10-06

## 2022-11-16 RX ORDER — FELODIPINE 5 MG/1
TABLET, EXTENDED RELEASE ORAL
Qty: 180 TABLET | Refills: 0 | Status: SHIPPED | OUTPATIENT
Start: 2022-11-16

## 2022-11-16 RX ORDER — ATENOLOL 100 MG/1
TABLET ORAL
Qty: 90 TABLET | Refills: 0 | Status: SHIPPED | OUTPATIENT
Start: 2022-11-16

## 2022-12-07 ENCOUNTER — OFFICE VISIT (OUTPATIENT)
Dept: INTERNAL MEDICINE CLINIC | Facility: CLINIC | Age: 77
End: 2022-12-07
Payer: COMMERCIAL

## 2022-12-07 ENCOUNTER — LAB ENCOUNTER (OUTPATIENT)
Dept: LAB | Age: 77
End: 2022-12-07
Attending: INTERNAL MEDICINE
Payer: COMMERCIAL

## 2022-12-07 VITALS
SYSTOLIC BLOOD PRESSURE: 128 MMHG | OXYGEN SATURATION: 97 % | RESPIRATION RATE: 16 BRPM | HEIGHT: 68.75 IN | DIASTOLIC BLOOD PRESSURE: 60 MMHG | WEIGHT: 237 LBS | HEART RATE: 74 BPM | TEMPERATURE: 98 F | BODY MASS INDEX: 35.1 KG/M2

## 2022-12-07 DIAGNOSIS — E79.0 HYPERURICEMIA: Chronic | ICD-10-CM

## 2022-12-07 DIAGNOSIS — I10 PRIMARY HYPERTENSION: Chronic | ICD-10-CM

## 2022-12-07 DIAGNOSIS — E55.9 VITAMIN D DEFICIENCY: ICD-10-CM

## 2022-12-07 DIAGNOSIS — R79.89 ELEVATED SERUM CREATININE: Chronic | ICD-10-CM

## 2022-12-07 DIAGNOSIS — Z00.00 ROUTINE GENERAL MEDICAL EXAMINATION AT A HEALTH CARE FACILITY: Primary | ICD-10-CM

## 2022-12-07 DIAGNOSIS — Z00.00 ROUTINE GENERAL MEDICAL EXAMINATION AT A HEALTH CARE FACILITY: ICD-10-CM

## 2022-12-07 DIAGNOSIS — E55.9 VITAMIN D DEFICIENCY: Chronic | ICD-10-CM

## 2022-12-07 LAB
ALBUMIN SERPL-MCNC: 3.4 G/DL (ref 3.4–5)
ALBUMIN/GLOB SERPL: 0.9 {RATIO} (ref 1–2)
ALP LIVER SERPL-CCNC: 80 U/L
ALT SERPL-CCNC: 27 U/L
ANION GAP SERPL CALC-SCNC: 5 MMOL/L (ref 0–18)
AST SERPL-CCNC: 21 U/L (ref 15–37)
ATRIAL RATE: 77 BPM
BASOPHILS # BLD AUTO: 0.04 X10(3) UL (ref 0–0.2)
BASOPHILS NFR BLD AUTO: 0.5 %
BILIRUB SERPL-MCNC: 0.5 MG/DL (ref 0.1–2)
BILIRUB UR QL STRIP.AUTO: NEGATIVE
BUN BLD-MCNC: 12 MG/DL (ref 7–18)
CALCIUM BLD-MCNC: 9 MG/DL (ref 8.5–10.1)
CHLORIDE SERPL-SCNC: 106 MMOL/L (ref 98–112)
CHOLEST SERPL-MCNC: 157 MG/DL (ref ?–200)
CLARITY UR REFRACT.AUTO: CLEAR
CO2 SERPL-SCNC: 29 MMOL/L (ref 21–32)
COLOR UR AUTO: YELLOW
CREAT BLD-MCNC: 1.37 MG/DL
EOSINOPHIL # BLD AUTO: 0.03 X10(3) UL (ref 0–0.7)
EOSINOPHIL NFR BLD AUTO: 0.4 %
ERYTHROCYTE [DISTWIDTH] IN BLOOD BY AUTOMATED COUNT: 13.7 %
EST. AVERAGE GLUCOSE BLD GHB EST-MCNC: 126 MG/DL (ref 68–126)
FASTING PATIENT LIPID ANSWER: YES
FASTING STATUS PATIENT QL REPORTED: YES
GFR SERPLBLD BASED ON 1.73 SQ M-ARVRAT: 52 ML/MIN/1.73M2 (ref 60–?)
GLOBULIN PLAS-MCNC: 3.6 G/DL (ref 2.8–4.4)
GLUCOSE BLD-MCNC: 113 MG/DL (ref 70–99)
GLUCOSE UR STRIP.AUTO-MCNC: NEGATIVE MG/DL
HBA1C MFR BLD: 6 % (ref ?–5.7)
HCT VFR BLD AUTO: 41.2 %
HDLC SERPL-MCNC: 34 MG/DL (ref 40–59)
HGB BLD-MCNC: 13.5 G/DL
IMM GRANULOCYTES # BLD AUTO: 0.02 X10(3) UL (ref 0–1)
IMM GRANULOCYTES NFR BLD: 0.3 %
KETONES UR STRIP.AUTO-MCNC: NEGATIVE MG/DL
LDLC SERPL CALC-MCNC: 101 MG/DL (ref ?–100)
LEUKOCYTE ESTERASE UR QL STRIP.AUTO: NEGATIVE
LYMPHOCYTES # BLD AUTO: 2.92 X10(3) UL (ref 1–4)
LYMPHOCYTES NFR BLD AUTO: 39.6 %
MCH RBC QN AUTO: 29.9 PG (ref 26–34)
MCHC RBC AUTO-ENTMCNC: 32.8 G/DL (ref 31–37)
MCV RBC AUTO: 91.2 FL
MONOCYTES # BLD AUTO: 0.65 X10(3) UL (ref 0.1–1)
MONOCYTES NFR BLD AUTO: 8.8 %
NEUTROPHILS # BLD AUTO: 3.71 X10 (3) UL (ref 1.5–7.7)
NEUTROPHILS # BLD AUTO: 3.71 X10(3) UL (ref 1.5–7.7)
NEUTROPHILS NFR BLD AUTO: 50.4 %
NITRITE UR QL STRIP.AUTO: NEGATIVE
NONHDLC SERPL-MCNC: 123 MG/DL (ref ?–130)
OSMOLALITY SERPL CALC.SUM OF ELEC: 291 MOSM/KG (ref 275–295)
P AXIS: 76 DEGREES
P-R INTERVAL: 220 MS
PH UR STRIP.AUTO: 7 [PH] (ref 5–8)
PLATELET # BLD AUTO: 221 10(3)UL (ref 150–450)
POTASSIUM SERPL-SCNC: 3.8 MMOL/L (ref 3.5–5.1)
PROT SERPL-MCNC: 7 G/DL (ref 6.4–8.2)
PROT UR STRIP.AUTO-MCNC: NEGATIVE MG/DL
Q-T INTERVAL: 370 MS
QRS DURATION: 76 MS
QTC CALCULATION (BEZET): 418 MS
R AXIS: 46 DEGREES
RBC # BLD AUTO: 4.52 X10(6)UL
RBC UR QL AUTO: NEGATIVE
SODIUM SERPL-SCNC: 140 MMOL/L (ref 136–145)
SP GR UR STRIP.AUTO: 1.02 (ref 1–1.03)
T AXIS: 42 DEGREES
T4 SERPL-MCNC: 8.2 UG/DL
TRIGL SERPL-MCNC: 118 MG/DL (ref 30–149)
TSI SER-ACNC: 3.11 MIU/ML (ref 0.36–3.74)
URATE SERPL-MCNC: 5.3 MG/DL
UROBILINOGEN UR STRIP.AUTO-MCNC: 1 MG/DL
VENTRICULAR RATE: 77 BPM
VIT D+METAB SERPL-MCNC: 35 NG/ML (ref 30–100)
VLDLC SERPL CALC-MCNC: 20 MG/DL (ref 0–30)
WBC # BLD AUTO: 7.4 X10(3) UL (ref 4–11)

## 2022-12-07 PROCEDURE — 3008F BODY MASS INDEX DOCD: CPT | Performed by: INTERNAL MEDICINE

## 2022-12-07 PROCEDURE — 81003 URINALYSIS AUTO W/O SCOPE: CPT | Performed by: INTERNAL MEDICINE

## 2022-12-07 PROCEDURE — 80050 GENERAL HEALTH PANEL: CPT | Performed by: INTERNAL MEDICINE

## 2022-12-07 PROCEDURE — 3078F DIAST BP <80 MM HG: CPT | Performed by: INTERNAL MEDICINE

## 2022-12-07 PROCEDURE — 84550 ASSAY OF BLOOD/URIC ACID: CPT | Performed by: INTERNAL MEDICINE

## 2022-12-07 PROCEDURE — 80061 LIPID PANEL: CPT | Performed by: INTERNAL MEDICINE

## 2022-12-07 PROCEDURE — 99397 PER PM REEVAL EST PAT 65+ YR: CPT | Performed by: INTERNAL MEDICINE

## 2022-12-07 PROCEDURE — 82306 VITAMIN D 25 HYDROXY: CPT | Performed by: INTERNAL MEDICINE

## 2022-12-07 PROCEDURE — 93000 ELECTROCARDIOGRAM COMPLETE: CPT | Performed by: INTERNAL MEDICINE

## 2022-12-07 PROCEDURE — 84436 ASSAY OF TOTAL THYROXINE: CPT | Performed by: INTERNAL MEDICINE

## 2022-12-07 PROCEDURE — 3074F SYST BP LT 130 MM HG: CPT | Performed by: INTERNAL MEDICINE

## 2022-12-07 PROCEDURE — 83036 HEMOGLOBIN GLYCOSYLATED A1C: CPT | Performed by: INTERNAL MEDICINE

## 2022-12-08 ENCOUNTER — TELEPHONE (OUTPATIENT)
Dept: INTERNAL MEDICINE CLINIC | Facility: CLINIC | Age: 77
End: 2022-12-08

## 2022-12-08 DIAGNOSIS — Z12.5 SCREENING FOR MALIGNANT NEOPLASM OF PROSTATE: Primary | ICD-10-CM

## 2022-12-08 NOTE — TELEPHONE ENCOUNTER
----- Message from Richard Michelle MD sent at 12/8/2022 10:56 AM CST -----  Reviewed results   Please order PSA

## 2022-12-27 LAB
ATRIAL RATE: 77 BPM
P AXIS: 76 DEGREES
P-R INTERVAL: 220 MS
Q-T INTERVAL: 370 MS
QRS DURATION: 76 MS
QTC CALCULATION (BEZET): 418 MS
R AXIS: 46 DEGREES
T AXIS: 42 DEGREES
VENTRICULAR RATE: 77 BPM

## 2022-12-29 DIAGNOSIS — I10 ESSENTIAL HYPERTENSION: Chronic | ICD-10-CM

## 2022-12-29 RX ORDER — HYDRALAZINE HYDROCHLORIDE 50 MG/1
TABLET, FILM COATED ORAL
Qty: 90 TABLET | Refills: 0 | Status: SHIPPED | OUTPATIENT
Start: 2022-12-29

## 2023-02-14 DIAGNOSIS — I10 ESSENTIAL HYPERTENSION: Chronic | ICD-10-CM

## 2023-02-14 RX ORDER — ATENOLOL 100 MG/1
TABLET ORAL
Qty: 90 TABLET | Refills: 0 | Status: SHIPPED | OUTPATIENT
Start: 2023-02-14

## 2023-02-14 RX ORDER — HYDRALAZINE HYDROCHLORIDE 50 MG/1
TABLET, FILM COATED ORAL
Qty: 180 TABLET | Refills: 0 | Status: SHIPPED | OUTPATIENT
Start: 2023-02-14

## 2023-02-14 RX ORDER — FELODIPINE 5 MG/1
TABLET, EXTENDED RELEASE ORAL
Qty: 180 TABLET | Refills: 0 | Status: SHIPPED | OUTPATIENT
Start: 2023-02-14

## 2023-02-14 RX ORDER — ALLOPURINOL 100 MG/1
TABLET ORAL
Qty: 90 TABLET | Refills: 0 | Status: SHIPPED | OUTPATIENT
Start: 2023-02-14

## 2023-02-28 ENCOUNTER — TELEPHONE (OUTPATIENT)
Dept: INTERNAL MEDICINE CLINIC | Facility: CLINIC | Age: 78
End: 2023-02-28

## 2023-02-28 NOTE — TELEPHONE ENCOUNTER
Pharmacy called about patient's medication felodipine 5mg  They stat that there on back order for that medication and they would like to give patient 10mg and have patient cut it in half.

## 2023-03-01 NOTE — TELEPHONE ENCOUNTER
Spoke with pharmacist   This RX is a ER and can not be cut in half   Per VM okay for patient to 10mg daily

## 2023-03-01 NOTE — TELEPHONE ENCOUNTER
Patient calling for update. Informed we were waiting on doctor to respond and I'll ask someone to call the pharmacy with Dr. Jay Archuleta response. Patient states he is out of medication.

## 2023-03-01 NOTE — TELEPHONE ENCOUNTER
Barbra Crimes calling from [address] [place] [address] with an update regarding this matter, please advise. Jj Yang is back in the office tomorrow.

## 2023-05-12 DIAGNOSIS — I10 ESSENTIAL HYPERTENSION: Chronic | ICD-10-CM

## 2023-05-15 RX ORDER — HYDRALAZINE HYDROCHLORIDE 50 MG/1
TABLET, FILM COATED ORAL
Qty: 180 TABLET | Refills: 0 | Status: SHIPPED | OUTPATIENT
Start: 2023-05-15

## 2023-05-15 RX ORDER — ALLOPURINOL 100 MG/1
TABLET ORAL
Qty: 90 TABLET | Refills: 0 | Status: SHIPPED | OUTPATIENT
Start: 2023-05-15

## 2023-05-15 RX ORDER — ATENOLOL 100 MG/1
TABLET ORAL
Qty: 90 TABLET | Refills: 0 | Status: SHIPPED | OUTPATIENT
Start: 2023-05-15

## 2023-05-15 NOTE — TELEPHONE ENCOUNTER
Protocol passed     Requesting:   Hydralazine 50mg filled 2/14/23 # 180 0 refills    Atenolol 100mg filled 2/14/23 # 90 0 refills     LOV: 12/7/22   RTC: 6 months   Recent Labs: 12/7/22     Upcoming OV: 6/8/23

## 2023-05-15 NOTE — TELEPHONE ENCOUNTER
No Protocol     Requesting: allopurinol 100mg   LOV:12/7/22   RTC: 6 months   Filled:2/14/23 # 90 0 refills   Recent Labs: 12/7/22     Upcoming OV: 6/8/23

## 2023-05-16 RX ORDER — FELODIPINE 5 MG/1
5 TABLET, EXTENDED RELEASE ORAL 2 TIMES DAILY
Qty: 180 TABLET | Refills: 0 | Status: SHIPPED | OUTPATIENT
Start: 2023-05-16

## 2023-06-08 ENCOUNTER — LAB ENCOUNTER (OUTPATIENT)
Dept: LAB | Age: 78
End: 2023-06-08
Attending: INTERNAL MEDICINE
Payer: COMMERCIAL

## 2023-06-08 ENCOUNTER — OFFICE VISIT (OUTPATIENT)
Dept: INTERNAL MEDICINE CLINIC | Facility: CLINIC | Age: 78
End: 2023-06-08
Payer: COMMERCIAL

## 2023-06-08 VITALS
RESPIRATION RATE: 16 BRPM | HEART RATE: 72 BPM | WEIGHT: 239.63 LBS | DIASTOLIC BLOOD PRESSURE: 70 MMHG | OXYGEN SATURATION: 99 % | HEIGHT: 68.75 IN | TEMPERATURE: 98 F | SYSTOLIC BLOOD PRESSURE: 130 MMHG | BODY MASS INDEX: 35.49 KG/M2

## 2023-06-08 DIAGNOSIS — I10 PRIMARY HYPERTENSION: Chronic | ICD-10-CM

## 2023-06-08 DIAGNOSIS — R79.89 ELEVATED SERUM CREATININE: Chronic | ICD-10-CM

## 2023-06-08 DIAGNOSIS — Z12.5 SCREENING FOR MALIGNANT NEOPLASM OF PROSTATE: ICD-10-CM

## 2023-06-08 DIAGNOSIS — R73.03 PREDIABETES: ICD-10-CM

## 2023-06-08 DIAGNOSIS — I10 PRIMARY HYPERTENSION: Primary | Chronic | ICD-10-CM

## 2023-06-08 LAB
ANION GAP SERPL CALC-SCNC: 5 MMOL/L (ref 0–18)
BUN BLD-MCNC: 10 MG/DL (ref 7–18)
CALCIUM BLD-MCNC: 9.1 MG/DL (ref 8.5–10.1)
CHLORIDE SERPL-SCNC: 107 MMOL/L (ref 98–112)
CHOLEST SERPL-MCNC: 132 MG/DL (ref ?–200)
CO2 SERPL-SCNC: 27 MMOL/L (ref 21–32)
COMPLEXED PSA SERPL-MCNC: 5.55 NG/ML (ref ?–4)
CREAT BLD-MCNC: 1.54 MG/DL
EST. AVERAGE GLUCOSE BLD GHB EST-MCNC: 128 MG/DL (ref 68–126)
FASTING PATIENT LIPID ANSWER: YES
FASTING STATUS PATIENT QL REPORTED: YES
GFR SERPLBLD BASED ON 1.73 SQ M-ARVRAT: 46 ML/MIN/1.73M2 (ref 60–?)
GLUCOSE BLD-MCNC: 109 MG/DL (ref 70–99)
HBA1C MFR BLD: 6.1 % (ref ?–5.7)
HDLC SERPL-MCNC: 34 MG/DL (ref 40–59)
LDLC SERPL CALC-MCNC: 76 MG/DL (ref ?–100)
NONHDLC SERPL-MCNC: 98 MG/DL (ref ?–130)
OSMOLALITY SERPL CALC.SUM OF ELEC: 288 MOSM/KG (ref 275–295)
POTASSIUM SERPL-SCNC: 4.3 MMOL/L (ref 3.5–5.1)
SODIUM SERPL-SCNC: 139 MMOL/L (ref 136–145)
TRIGL SERPL-MCNC: 121 MG/DL (ref 30–149)
VLDLC SERPL CALC-MCNC: 19 MG/DL (ref 0–30)

## 2023-06-08 PROCEDURE — 80061 LIPID PANEL: CPT

## 2023-06-08 PROCEDURE — 90750 HZV VACC RECOMBINANT IM: CPT | Performed by: INTERNAL MEDICINE

## 2023-06-08 PROCEDURE — 3078F DIAST BP <80 MM HG: CPT | Performed by: INTERNAL MEDICINE

## 2023-06-08 PROCEDURE — 83036 HEMOGLOBIN GLYCOSYLATED A1C: CPT

## 2023-06-08 PROCEDURE — 99213 OFFICE O/P EST LOW 20 MIN: CPT | Performed by: INTERNAL MEDICINE

## 2023-06-08 PROCEDURE — 3075F SYST BP GE 130 - 139MM HG: CPT | Performed by: INTERNAL MEDICINE

## 2023-06-08 PROCEDURE — 36415 COLL VENOUS BLD VENIPUNCTURE: CPT

## 2023-06-08 PROCEDURE — 3008F BODY MASS INDEX DOCD: CPT | Performed by: INTERNAL MEDICINE

## 2023-06-08 PROCEDURE — 80048 BASIC METABOLIC PNL TOTAL CA: CPT

## 2023-06-08 PROCEDURE — 90471 IMMUNIZATION ADMIN: CPT | Performed by: INTERNAL MEDICINE

## 2023-08-08 RX ORDER — FELODIPINE 5 MG/1
5 TABLET, EXTENDED RELEASE ORAL 2 TIMES DAILY
Qty: 180 TABLET | Refills: 1 | Status: SHIPPED | OUTPATIENT
Start: 2023-08-08

## 2023-08-08 NOTE — TELEPHONE ENCOUNTER
LOV: 6/8/23   RTC: 6 months   Filled: 5/16/23 # 180 0 refills   Labs: 6/8/23   Future Appointments   Date Time Provider Julito Barnhart   8/22/2023 12:30 PM Nighat Parker MD [facility]

## 2023-08-15 NOTE — PROGRESS NOTES
HPI:     Barb Pastrana is a [de-identified]year old male with a PMH of HTN, pre-DM, gout. He presents as a consult with:  1. Elevated PSA     PCP - Brook Aguirre Urologist - [address] 5/18/20    Feels like today's visit is a waste of time. AUA SS is 7/35 with 2 n, f; 1 w, I, ZEFERINO. Happy with LUTS. Incontinence: none  AMANDA: ~ 40 g prostate, no nodules or tenderness    UA is negative    UTI hx: none  Gross hematuria: none  Tobacco hx: none  Kidney stone hx: none  Fam h/o  malignancy: none    Has mild ED but overall happy and prefers observation. Prior PSAs:  - 5.55 6/8/23  - 4.96 12/3/21  - 4.09 10/19/20  - 3.94 4/20/20  - 4.68 10/17/19  - 3.95 3/11/19  - 4.21 9/17/18  - 3.74 9/18/17  - 4.00 7/29/16  - 2.73 7/23/15  - 2.65 7/23/14    We discussed that his PSA would fall WNL given his age but has been rising. We discussed options for elevated PSA, including serial PSA measurements, 4K score, MRI of the prostate. His insurance likely would not cover 4K given his age so we will recheck a PSA today. If this is stable he wants to continue regular checks with PCP. If rising he would be open to pMRI. He wants to remain as conservative as possible for elevated PSA. Will recheck PSA today, observation for BPH, mild ED. HISTORY:  Past Medical History:   Diagnosis Date    Abnormal stress test     BPH (benign prostatic hyperplasia)     Elevated serum creatinine 8/7/2014    w/u     Gout     Kidney cysts     Leonel    Unspecified essential hypertension       Past Surgical History:   Procedure Laterality Date    CHOLECYSTECTOMY      COLONOSCOPY  otoniel. 3 yrs.  ago    LAPAROSCOPIC CHOLECYSTECTOMY N/A 2/28/2015    Procedure: LAPAROSCOPIC CHOLECYSTECTOMY;  Surgeon: Benoit Aden MD;  Location: [facility] MAIN OR    OTHER SURGICAL HISTORY  2/15    Laparoscopic Cholecystectomy AND COMMON BILE DUCT MANIPULATION with Choleangiogram      Family History   Problem Relation Age of Onset    Thyroid Disorder Mother       Social History: Social History     Socioeconomic History    Marital status:    Tobacco Use    Smoking status: Never    Smokeless tobacco: Never   Vaping Use    Vaping Use: Never used   Substance and Sexual Activity    Alcohol use: Never    Drug use: Never        Medications (Active prior to today's visit):  Current Outpatient Medications   Medication Sig Dispense Refill    allopurinol 100 MG Oral Tab Take 1 tablet (100 mg total) by mouth daily. 90 tablet 1    hydrALAZINE 50 MG Oral Tab Take 1 tablet (50 mg total) by mouth 2 (two) times daily. 180 tablet 0    atenolol 100 MG Oral Tab Take 1 tablet (100 mg total) by mouth daily. 90 tablet 0    felodipine ER 5 MG Oral Tablet 24 Hr Take 1 tablet by mouth twice daily 180 tablet 1    latanoprost (XALATAN) 0.005 % Ophthalmic Solution Place 1 drop into both eyes nightly. Allergies:  No Known Allergies      ROS:     A comprehensive 10 point review of systems was completed. Pertinent positives and negatives noted in the the HPI. PHYSICAL EXAM:     GENERAL APPEARANCE: well, developed, well nourished, in no acute distress  NEUROLOGIC: nonfocal, alert and oriented  HEAD: normocephalic, atraumatic  EYES: sclera non-icteric  EARS: hearing intact  ORAL CAVITY: mucosa moist  NECK/THYROID: no obvious goiter or masses  LUNGS: nonlabored breathing  ABDOMEN: soft, no obvious masses or tenderness  SKIN: no obvious rashes    : as noted above    ASSESSMENT/PLAN:   Diagnoses and all orders for this visit:    Elevated PSA  -     PSA TOTAL, DIAGNOSTIC; Future    BPH with obstruction/lower urinary tract symptoms    Erectile dysfunction, unspecified erectile dysfunction type      - as noted above. Thanks again for this consult.     Alverto Patten MD, [facility]  Urologist  [place] [address]  Office: [phone]

## 2023-08-19 DIAGNOSIS — I10 ESSENTIAL HYPERTENSION: Chronic | ICD-10-CM

## 2023-08-21 RX ORDER — ATENOLOL 100 MG/1
100 TABLET ORAL DAILY
Qty: 90 TABLET | Refills: 0 | Status: SHIPPED | OUTPATIENT
Start: 2023-08-21

## 2023-08-21 RX ORDER — HYDRALAZINE HYDROCHLORIDE 50 MG/1
50 TABLET, FILM COATED ORAL 2 TIMES DAILY
Qty: 180 TABLET | Refills: 0 | Status: SHIPPED | OUTPATIENT
Start: 2023-08-21

## 2023-08-21 NOTE — TELEPHONE ENCOUNTER
atenolol 100 MG Oral Tab         Sig: Take 1 tablet (100 mg total) by mouth daily.     Disp: 90 tablet    Refills: 0    Start: 8/19/2023    Class: Normal    Non-formulary    Last ordered: 3 months ago by Olivia Kaplan MD     Hypertension Medications Protocol Jwndlg5408/19/2023 08:36 PM   Protocol Details CMP or BMP in past 12 months    Last serum creatinine< 2.0    Appointment in past 6 or next 3 months        LOV 6/8/23  RTC 6 months   Filled 5/15/23 90 tabs 0 refills   Last labs 6/8/23  Future Appointments   Date Time Provider Julito Barnhart   8/22/2023 12:30 PM Kim Khanna MD [facility]

## 2023-08-21 NOTE — TELEPHONE ENCOUNTER
Requested Prescriptions     Pending Prescriptions Disp Refills    hydrALAZINE 50 MG Oral Tab 180 tablet 0     Sig: Take 1 tablet (50 mg total) by mouth 2 (two) times daily.      Protocol passed    LOV 6/8/23  RTC 6 months   Filled 5/15/23 180 tabs 0 refills   Future Appointments   Date Time Provider Julito Barnhart   8/22/2023 12:30 PM Alejo Lopez MD [facility]

## 2023-08-22 ENCOUNTER — LAB ENCOUNTER (OUTPATIENT)
Dept: LAB | Age: 78
End: 2023-08-22
Attending: UROLOGY
Payer: COMMERCIAL

## 2023-08-22 ENCOUNTER — OFFICE VISIT (OUTPATIENT)
Dept: SURGERY | Facility: CLINIC | Age: 78
End: 2023-08-22

## 2023-08-22 DIAGNOSIS — N40.1 BPH WITH OBSTRUCTION/LOWER URINARY TRACT SYMPTOMS: ICD-10-CM

## 2023-08-22 DIAGNOSIS — N52.9 ERECTILE DYSFUNCTION, UNSPECIFIED ERECTILE DYSFUNCTION TYPE: ICD-10-CM

## 2023-08-22 DIAGNOSIS — N13.8 BPH WITH OBSTRUCTION/LOWER URINARY TRACT SYMPTOMS: ICD-10-CM

## 2023-08-22 DIAGNOSIS — R97.20 ELEVATED PSA: Primary | ICD-10-CM

## 2023-08-22 DIAGNOSIS — R97.20 ELEVATED PSA: ICD-10-CM

## 2023-08-22 LAB — PSA SERPL-MCNC: 6.35 NG/ML (ref ?–4)

## 2023-08-22 PROCEDURE — 99204 OFFICE O/P NEW MOD 45 MIN: CPT | Performed by: UROLOGY

## 2023-08-22 PROCEDURE — 84153 ASSAY OF PSA TOTAL: CPT

## 2023-08-22 PROCEDURE — 36415 COLL VENOUS BLD VENIPUNCTURE: CPT

## 2023-08-22 RX ORDER — ALLOPURINOL 100 MG/1
100 TABLET ORAL DAILY
Qty: 90 TABLET | Refills: 1 | Status: SHIPPED | OUTPATIENT
Start: 2023-08-22

## 2023-08-22 NOTE — TELEPHONE ENCOUNTER
No Protocol     Requesting: allopurinol 100mg      LOV: 6/8/23   RTC: 6 months   Filled: 5/15/23 # 90 0 refills   Recent Labs: 6/8/23     Upcoming OV: none scheduled

## 2023-08-22 NOTE — PROGRESS NOTES
Results reviewed. Please inform patient his PSA has risen further. I would recommend he get a prostate MRI as we discussed today in the office. If he has questions please add him on for phone visit.     Thanks,  MPH

## 2023-08-24 ENCOUNTER — TELEPHONE (OUTPATIENT)
Dept: SURGERY | Facility: CLINIC | Age: 78
End: 2023-08-24

## 2023-08-24 NOTE — TELEPHONE ENCOUNTER
----- Message from Sulma Mccray MD sent at 8/22/2023  3:18 PM CDT -----  Results reviewed. Please inform patient his PSA has risen further. I would recommend he get a prostate MRI as we discussed today in the office. If he has questions please add him on for phone visit.     Thanks,  MPH

## 2023-09-11 ENCOUNTER — HOSPITAL ENCOUNTER (OUTPATIENT)
Dept: MRI IMAGING | Facility: HOSPITAL | Age: 78
Discharge: HOME | End: 2023-09-11
Attending: UROLOGY
Payer: COMMERCIAL

## 2023-09-11 DIAGNOSIS — R97.20 ELEVATED PSA: ICD-10-CM

## 2023-09-11 PROCEDURE — A9575 INJ GADOTERATE MEGLUMI 0.1ML: HCPCS | Performed by: UROLOGY

## 2023-09-11 PROCEDURE — 72197 MRI PELVIS W/O & W/DYE: CPT | Performed by: UROLOGY

## 2023-09-11 RX ORDER — GADOTERATE MEGLUMINE 376.9 MG/ML
20 INJECTION INTRAVENOUS
Status: COMPLETED | OUTPATIENT
Start: 2023-09-11 | End: 2023-09-11

## 2023-09-11 RX ADMIN — GADOTERATE MEGLUMINE 20 ML: 376.9 INJECTION INTRAVENOUS at 15:22:00

## 2023-11-17 DIAGNOSIS — I10 ESSENTIAL HYPERTENSION: Chronic | ICD-10-CM

## 2023-11-17 RX ORDER — ATENOLOL 100 MG/1
100 TABLET ORAL DAILY
Qty: 90 TABLET | Refills: 0 | Status: SHIPPED | OUTPATIENT
Start: 2023-11-17

## 2023-11-17 RX ORDER — HYDRALAZINE HYDROCHLORIDE 50 MG/1
50 TABLET, FILM COATED ORAL 2 TIMES DAILY
Qty: 180 TABLET | Refills: 0 | Status: SHIPPED | OUTPATIENT
Start: 2023-11-17

## 2023-11-17 RX ORDER — ALLOPURINOL 100 MG/1
100 TABLET ORAL DAILY
Qty: 90 TABLET | Refills: 1 | Status: SHIPPED | OUTPATIENT
Start: 2023-11-17

## 2023-11-17 NOTE — TELEPHONE ENCOUNTER
hydrALAZINE 50 MG Oral Tab         Sig: Take 1 tablet (50 mg total) by mouth 2 (two) times daily. Disp: 180 tablet    Refills: 0    Start: 11/17/2023    Class: Normal    Non-formulary For: Essential hypertension    Last ordered: 2 months ago (8/21/2023) by Kalpesh Cruz MD    Hypertension Medications Protocol Xycgvt8311/17/2023 11:34 AM   Protocol Details CMP or BMP in past 12 months    Last serum creatinine< 2.0    Appointment in past 6 or next 3 months       allopurinol 100 MG Oral Tab         Sig: Take 1 tablet (100 mg total) by mouth daily.     Disp: 90 tablet    Refills: 1    Start: 11/17/2023    Class: Normal    Non-formulary    Last ordered: 2 months ago (8/22/2023) by Kalpesh Cruz MD       To be filled at: [facility] [id number], [address] [facility]  [address] [facility] [phone], [phone]

## 2023-11-17 NOTE — TELEPHONE ENCOUNTER
atenolol 100 MG Oral Tab         Sig: Take 1 tablet (100 mg total) by mouth daily.     Disp: 90 tablet    Refills: 0    Start: 11/17/2023    Class: Normal    Non-formulary    Last ordered: 2 months ago (8/21/2023) by Chanel Perez MD    Hypertension Medications Protocol Frzkbk8211/17/2023 11:34 AM   Protocol Details CMP or BMP in past 12 months    Last serum creatinine< 2.0    Appointment in past 6 or next 3 months      To be filled at: [facility] [address]  [address] [address] [phone], [phone]

## 2023-12-26 ENCOUNTER — LAB ENCOUNTER (OUTPATIENT)
Dept: LAB | Age: 78
End: 2023-12-26
Attending: INTERNAL MEDICINE
Payer: COMMERCIAL

## 2023-12-26 ENCOUNTER — OFFICE VISIT (OUTPATIENT)
Dept: INTERNAL MEDICINE CLINIC | Facility: CLINIC | Age: 78
End: 2023-12-26
Payer: COMMERCIAL

## 2023-12-26 VITALS
TEMPERATURE: 97 F | SYSTOLIC BLOOD PRESSURE: 160 MMHG | BODY MASS INDEX: 35.16 KG/M2 | WEIGHT: 237.38 LBS | RESPIRATION RATE: 16 BRPM | OXYGEN SATURATION: 98 % | HEART RATE: 98 BPM | DIASTOLIC BLOOD PRESSURE: 80 MMHG | HEIGHT: 69 IN

## 2023-12-26 DIAGNOSIS — N18.31 STAGE 3A CHRONIC KIDNEY DISEASE (HCC): Chronic | ICD-10-CM

## 2023-12-26 DIAGNOSIS — E79.0 HYPERURICEMIA: Chronic | ICD-10-CM

## 2023-12-26 DIAGNOSIS — Z00.00 ROUTINE GENERAL MEDICAL EXAMINATION AT A HEALTH CARE FACILITY: Primary | ICD-10-CM

## 2023-12-26 DIAGNOSIS — Z00.00 ROUTINE GENERAL MEDICAL EXAMINATION AT A HEALTH CARE FACILITY: ICD-10-CM

## 2023-12-26 DIAGNOSIS — E55.9 VITAMIN D DEFICIENCY: ICD-10-CM

## 2023-12-26 DIAGNOSIS — I10 PRIMARY HYPERTENSION: Chronic | ICD-10-CM

## 2023-12-26 DIAGNOSIS — E55.9 VITAMIN D DEFICIENCY: Chronic | ICD-10-CM

## 2023-12-26 LAB
ALBUMIN SERPL-MCNC: 3.4 G/DL (ref 3.4–5)
ALBUMIN/GLOB SERPL: 0.9 {RATIO} (ref 1–2)
ALP LIVER SERPL-CCNC: 83 U/L
ALT SERPL-CCNC: 24 U/L
ANION GAP SERPL CALC-SCNC: 6 MMOL/L (ref 0–18)
AST SERPL-CCNC: 22 U/L (ref 15–37)
BASOPHILS # BLD AUTO: 0.03 X10(3) UL (ref 0–0.2)
BASOPHILS NFR BLD AUTO: 0.4 %
BILIRUB SERPL-MCNC: 0.5 MG/DL (ref 0.1–2)
BILIRUB UR QL STRIP.AUTO: NEGATIVE
BUN BLD-MCNC: 15 MG/DL (ref 9–23)
CALCIUM BLD-MCNC: 8.8 MG/DL (ref 8.5–10.1)
CHLORIDE SERPL-SCNC: 106 MMOL/L (ref 98–112)
CHOLEST SERPL-MCNC: 144 MG/DL (ref ?–200)
CLARITY UR REFRACT.AUTO: CLEAR
CO2 SERPL-SCNC: 27 MMOL/L (ref 21–32)
CREAT BLD-MCNC: 1.64 MG/DL
EGFRCR SERPLBLD CKD-EPI 2021: 42 ML/MIN/1.73M2 (ref 60–?)
EOSINOPHIL # BLD AUTO: 0.04 X10(3) UL (ref 0–0.7)
EOSINOPHIL NFR BLD AUTO: 0.5 %
ERYTHROCYTE [DISTWIDTH] IN BLOOD BY AUTOMATED COUNT: 14.3 %
EST. AVERAGE GLUCOSE BLD GHB EST-MCNC: 128 MG/DL (ref 68–126)
FASTING PATIENT LIPID ANSWER: YES
FASTING STATUS PATIENT QL REPORTED: YES
GLOBULIN PLAS-MCNC: 4 G/DL (ref 2.8–4.4)
GLUCOSE BLD-MCNC: 126 MG/DL (ref 70–99)
GLUCOSE UR STRIP.AUTO-MCNC: NORMAL MG/DL
HBA1C MFR BLD: 6.1 % (ref ?–5.7)
HCT VFR BLD AUTO: 41.1 %
HDLC SERPL-MCNC: 31 MG/DL (ref 40–59)
HGB BLD-MCNC: 13.4 G/DL
IMM GRANULOCYTES # BLD AUTO: 0.02 X10(3) UL (ref 0–1)
IMM GRANULOCYTES NFR BLD: 0.3 %
KETONES UR STRIP.AUTO-MCNC: NEGATIVE MG/DL
LDLC SERPL CALC-MCNC: 92 MG/DL (ref ?–100)
LEUKOCYTE ESTERASE UR QL STRIP.AUTO: NEGATIVE
LYMPHOCYTES # BLD AUTO: 3.19 X10(3) UL (ref 1–4)
LYMPHOCYTES NFR BLD AUTO: 40.9 %
MCH RBC QN AUTO: 29 PG (ref 26–34)
MCHC RBC AUTO-ENTMCNC: 32.6 G/DL (ref 31–37)
MCV RBC AUTO: 89 FL
MONOCYTES # BLD AUTO: 0.68 X10(3) UL (ref 0.1–1)
MONOCYTES NFR BLD AUTO: 8.7 %
NEUTROPHILS # BLD AUTO: 3.84 X10 (3) UL (ref 1.5–7.7)
NEUTROPHILS # BLD AUTO: 3.84 X10(3) UL (ref 1.5–7.7)
NEUTROPHILS NFR BLD AUTO: 49.2 %
NITRITE UR QL STRIP.AUTO: NEGATIVE
NONHDLC SERPL-MCNC: 113 MG/DL (ref ?–130)
OSMOLALITY SERPL CALC.SUM OF ELEC: 290 MOSM/KG (ref 275–295)
PH UR STRIP.AUTO: 6 [PH] (ref 5–8)
PLATELET # BLD AUTO: 248 10(3)UL (ref 150–450)
POTASSIUM SERPL-SCNC: 3.7 MMOL/L (ref 3.5–5.1)
PROT SERPL-MCNC: 7.4 G/DL (ref 6.4–8.2)
PROT UR STRIP.AUTO-MCNC: NEGATIVE MG/DL
RBC # BLD AUTO: 4.62 X10(6)UL
RBC UR QL AUTO: NEGATIVE
SODIUM SERPL-SCNC: 139 MMOL/L (ref 136–145)
SP GR UR STRIP.AUTO: 1.02 (ref 1–1.03)
TRIGL SERPL-MCNC: 115 MG/DL (ref 30–149)
TSI SER-ACNC: 5.38 MIU/ML (ref 0.36–3.74)
URATE SERPL-MCNC: 6 MG/DL
UROBILINOGEN UR STRIP.AUTO-MCNC: NORMAL MG/DL
VIT D+METAB SERPL-MCNC: 75.1 NG/ML (ref 30–100)
VLDLC SERPL CALC-MCNC: 19 MG/DL (ref 0–30)
WBC # BLD AUTO: 7.8 X10(3) UL (ref 4–11)

## 2023-12-26 PROCEDURE — 80061 LIPID PANEL: CPT

## 2023-12-26 PROCEDURE — 3008F BODY MASS INDEX DOCD: CPT | Performed by: INTERNAL MEDICINE

## 2023-12-26 PROCEDURE — 84443 ASSAY THYROID STIM HORMONE: CPT

## 2023-12-26 PROCEDURE — 84550 ASSAY OF BLOOD/URIC ACID: CPT

## 2023-12-26 PROCEDURE — 80053 COMPREHEN METABOLIC PANEL: CPT

## 2023-12-26 PROCEDURE — 82306 VITAMIN D 25 HYDROXY: CPT

## 2023-12-26 PROCEDURE — 3077F SYST BP >= 140 MM HG: CPT | Performed by: INTERNAL MEDICINE

## 2023-12-26 PROCEDURE — 3079F DIAST BP 80-89 MM HG: CPT | Performed by: INTERNAL MEDICINE

## 2023-12-26 PROCEDURE — 99397 PER PM REEVAL EST PAT 65+ YR: CPT | Performed by: INTERNAL MEDICINE

## 2023-12-26 PROCEDURE — 36415 COLL VENOUS BLD VENIPUNCTURE: CPT

## 2023-12-26 PROCEDURE — 83036 HEMOGLOBIN GLYCOSYLATED A1C: CPT

## 2023-12-26 PROCEDURE — 93000 ELECTROCARDIOGRAM COMPLETE: CPT | Performed by: INTERNAL MEDICINE

## 2023-12-26 PROCEDURE — 81003 URINALYSIS AUTO W/O SCOPE: CPT

## 2023-12-26 PROCEDURE — 85025 COMPLETE CBC W/AUTO DIFF WBC: CPT

## 2023-12-27 LAB
ATRIAL RATE: 85 BPM
P AXIS: 64 DEGREES
P-R INTERVAL: 214 MS
Q-T INTERVAL: 362 MS
QRS DURATION: 76 MS
QTC CALCULATION (BEZET): 430 MS
R AXIS: 62 DEGREES
T AXIS: 57 DEGREES
VENTRICULAR RATE: 85 BPM

## 2024-01-09 ENCOUNTER — OFFICE VISIT (OUTPATIENT)
Dept: INTERNAL MEDICINE CLINIC | Facility: CLINIC | Age: 79
End: 2024-01-09
Payer: COMMERCIAL

## 2024-01-09 VITALS
WEIGHT: 237 LBS | OXYGEN SATURATION: 98 % | TEMPERATURE: 98 F | DIASTOLIC BLOOD PRESSURE: 86 MMHG | RESPIRATION RATE: 16 BRPM | HEART RATE: 80 BPM | BODY MASS INDEX: 35.1 KG/M2 | HEIGHT: 69 IN | SYSTOLIC BLOOD PRESSURE: 138 MMHG

## 2024-01-09 DIAGNOSIS — N18.31 STAGE 3A CHRONIC KIDNEY DISEASE (HCC): Chronic | ICD-10-CM

## 2024-01-09 DIAGNOSIS — R79.89 ABNORMAL THYROID BLOOD TEST: ICD-10-CM

## 2024-01-09 DIAGNOSIS — I10 PRIMARY HYPERTENSION: Primary | Chronic | ICD-10-CM

## 2024-01-09 PROCEDURE — 3079F DIAST BP 80-89 MM HG: CPT | Performed by: INTERNAL MEDICINE

## 2024-01-09 PROCEDURE — 3075F SYST BP GE 130 - 139MM HG: CPT | Performed by: INTERNAL MEDICINE

## 2024-01-09 PROCEDURE — 99213 OFFICE O/P EST LOW 20 MIN: CPT | Performed by: INTERNAL MEDICINE

## 2024-01-09 PROCEDURE — 3008F BODY MASS INDEX DOCD: CPT | Performed by: INTERNAL MEDICINE

## 2024-01-09 NOTE — PROGRESS NOTES
Easton Nixon  1945 is a 78 year old male.    No chief complaint on file.       HPI:   Lab discussion BP check  Current Outpatient Medications   Medication Sig Dispense Refill    atenolol 100 MG Oral Tab Take 1 tablet (100 mg total) by mouth daily. 90 tablet 0    hydrALAZINE 50 MG Oral Tab Take 1 tablet (50 mg total) by mouth 2 (two) times daily. 180 tablet 0    allopurinol 100 MG Oral Tab Take 1 tablet (100 mg total) by mouth daily. 90 tablet 1    felodipine ER 5 MG Oral Tablet 24 Hr Take 1 tablet by mouth twice daily 180 tablet 1    latanoprost (XALATAN) 0.005 % Ophthalmic Solution Place 1 drop into both eyes nightly.        Past Medical History:   Diagnosis Date    Abnormal stress test     BPH (benign prostatic hyperplasia)     Elevated serum creatinine 2014    w/u     Gout     Kidney cysts     Leonel    Unspecified essential hypertension       Social History:  Social History     Socioeconomic History    Marital status:    Tobacco Use    Smoking status: Never    Smokeless tobacco: Never   Vaping Use    Vaping Use: Never used   Substance and Sexual Activity    Alcohol use: Never    Drug use: Never        REVIEW OF SYSTEMS:   Cardiovascular:   Syncope none. Rapid heart beat at rest no. Change in exercise tolerance no. Chest pain no. Chest pain while awake none. Cold extremities no. Dizziness no. Dyspnea on exertion none. Fainting none. Fatigue no. High blood pressure on medication(s). Irregular heart beat no. Leg edema no. Murmurs no. Orthopnea no.      EXAM:   /86 (BP Location: Left arm, Patient Position: Sitting, Cuff Size: adult)   Pulse 80   Temp 97.6 °F (36.4 °C) (Temporal)   Resp 16   Ht 5' 9\" (1.753 m)   Wt 237 lb (107.5 kg)   SpO2 98%   BMI 35.00 kg/m²   HEENT:   jvp not raised.   Ear canals: normal.   Ear drums: normal .   Ears: unremarkable.   Mouth: unremarkable.   Nasal septum: midline.   Pharynx: normal.   Sinuses: non-tender.   HEART:   Clicks: no.   Distal Pulses  Palpable: yes.   Edema: none visible .   Gallop: no .   Heart sounds: normal S1S2.   Murmurs: none.   Rhythm: regular.   LUNGS:   Airflow: normal air movement.   Auscultation: no wheezing/rhonchi/rales.   Breath sounds bilaterally: symmetrical.       ASSESSMENT AND PLAN:   Diagnoses and all orders for this visit:    Primary hypertension    Stage 3a chronic kidney disease (HCC)  -     Basic Metabolic Panel (8) [E]; Future    Abnormal thyroid blood test  -     TSH and Free T4 [E]; Future      Repeat blood work end of March  There are no Patient Instructions on file for this visit.     The patient indicates understanding of these issues and agrees to the plan.  The patient is asked to No follow-ups on file..    Carlos Doe MD

## 2024-02-07 NOTE — TELEPHONE ENCOUNTER
Protocol failed     Felodipine ER 5mg     LOV: 1/9/24   RTC: 6 months   Filled: 8/8/23 # 180 1 refill   Labs: 12/26/23   No future appointments.

## 2024-02-08 RX ORDER — FELODIPINE 5 MG/1
5 TABLET, EXTENDED RELEASE ORAL 2 TIMES DAILY
Qty: 180 TABLET | Refills: 0 | Status: SHIPPED | OUTPATIENT
Start: 2024-02-08

## 2024-02-13 DIAGNOSIS — I10 ESSENTIAL HYPERTENSION: Chronic | ICD-10-CM

## 2024-02-13 RX ORDER — ATENOLOL 100 MG/1
100 TABLET ORAL DAILY
Qty: 90 TABLET | Refills: 0 | Status: SHIPPED | OUTPATIENT
Start: 2024-02-13 | End: 2024-02-14

## 2024-02-13 RX ORDER — HYDRALAZINE HYDROCHLORIDE 50 MG/1
50 TABLET, FILM COATED ORAL 2 TIMES DAILY
Qty: 180 TABLET | Refills: 0 | Status: SHIPPED | OUTPATIENT
Start: 2024-02-13 | End: 2024-02-14

## 2024-02-13 NOTE — TELEPHONE ENCOUNTER
Name from pharmacy: Atenolol 100 MG Oral Tablet          Will file in chart as: ATENOLOL 100 MG Oral Tab    Sig: Take 1 tablet by mouth once daily    Disp: 90 tablet    Refills: 0    Start: 2/13/2024    Class: Normal    Non-formulary    Last ordered: 2 months ago (11/17/2023) by Carlos Doe MD    Last refill: 11/20/2023    Rx #: [id number]    Hypertension Medications Protocol Lcdakz2002/13/2024 05:50 AM   Protocol Details EGFRCR or GFRAA > 50    CMP or BMP in past 12 months    Last BP reading less than 140/90    In person appointment or virtual visit in the past 12 mos or appointment in next 3 mos       Name from pharmacy: hydrALAZINE HCl 50 MG Oral Tablet         Will file in chart as: HYDRALAZINE 50 MG Oral Tab    Sig: Take 1 tablet by mouth twice daily    Disp: 180 tablet    Refills: 0    Start: 2/13/2024    Class: Normal    Non-formulary For: Essential hypertension    Last ordered: 2 months ago (11/17/2023) by Carlos Doe MD    Last refill: 11/20/2023    Rx #: [id number]    Hypertension Medications Protocol Pepodk1402/13/2024 05:50 AM   Protocol Details EGFRCR or GFRAA > 50    CMP or BMP in past 12 months    Last BP reading less than 140/90    In person appointment or virtual visit in the past 12 mos or appointment in next 3 mos      To be filled at: [facility] [address] [place] [address] [phone], [phone]

## 2024-02-14 DIAGNOSIS — I10 ESSENTIAL HYPERTENSION: Chronic | ICD-10-CM

## 2024-02-15 RX ORDER — ALLOPURINOL 100 MG/1
100 TABLET ORAL DAILY
Qty: 90 TABLET | Refills: 0 | Status: SHIPPED | OUTPATIENT
Start: 2024-02-15

## 2024-02-15 RX ORDER — ATENOLOL 100 MG/1
100 TABLET ORAL DAILY
Qty: 90 TABLET | Refills: 0 | Status: SHIPPED | OUTPATIENT
Start: 2024-02-15

## 2024-02-15 RX ORDER — HYDRALAZINE HYDROCHLORIDE 50 MG/1
50 TABLET, FILM COATED ORAL 2 TIMES DAILY
Qty: 180 TABLET | Refills: 0 | Status: SHIPPED | OUTPATIENT
Start: 2024-02-15

## 2024-03-25 ENCOUNTER — LAB ENCOUNTER (OUTPATIENT)
Dept: LAB | Age: 79
End: 2024-03-25
Attending: INTERNAL MEDICINE
Payer: COMMERCIAL

## 2024-03-25 DIAGNOSIS — N18.31 STAGE 3A CHRONIC KIDNEY DISEASE (HCC): Chronic | ICD-10-CM

## 2024-03-25 DIAGNOSIS — R79.89 ABNORMAL THYROID BLOOD TEST: ICD-10-CM

## 2024-03-25 LAB
ANION GAP SERPL CALC-SCNC: 5 MMOL/L (ref 0–18)
BUN BLD-MCNC: 11 MG/DL (ref 9–23)
BUN/CREAT SERPL: 8.5 (ref 10–20)
CALCIUM BLD-MCNC: 9 MG/DL (ref 8.7–10.4)
CHLORIDE SERPL-SCNC: 107 MMOL/L (ref 98–112)
CO2 SERPL-SCNC: 29 MMOL/L (ref 21–32)
CREAT BLD-MCNC: 1.29 MG/DL
EGFRCR SERPLBLD CKD-EPI 2021: 56 ML/MIN/1.73M2 (ref 60–?)
FASTING STATUS PATIENT QL REPORTED: YES
GLUCOSE BLD-MCNC: 96 MG/DL (ref 70–99)
OSMOLALITY SERPL CALC.SUM OF ELEC: 291 MOSM/KG (ref 275–295)
POTASSIUM SERPL-SCNC: 3.6 MMOL/L (ref 3.5–5.1)
SODIUM SERPL-SCNC: 141 MMOL/L (ref 136–145)
T4 FREE SERPL-MCNC: 1.1 NG/DL (ref 0.8–1.7)
TSI SER-ACNC: 4.85 MIU/ML (ref 0.55–4.78)

## 2024-03-25 PROCEDURE — 84439 ASSAY OF FREE THYROXINE: CPT | Performed by: INTERNAL MEDICINE

## 2024-03-25 PROCEDURE — 84443 ASSAY THYROID STIM HORMONE: CPT | Performed by: INTERNAL MEDICINE

## 2024-03-25 PROCEDURE — 80048 BASIC METABOLIC PNL TOTAL CA: CPT | Performed by: INTERNAL MEDICINE

## 2024-04-03 DIAGNOSIS — R79.89 ABNORMAL THYROID BLOOD TEST: Primary | ICD-10-CM

## 2024-04-09 DIAGNOSIS — R79.89 ABNORMAL THYROID BLOOD TEST: Primary | ICD-10-CM

## 2024-05-03 NOTE — TELEPHONE ENCOUNTER
Name from pharmacy: Felodipine ER 5 MG Oral Tablet Extended Release 24 Hour          Will file in chart as: FELODIPINE ER 5 MG Oral Tablet 24 Hr    Sig: Take 1 tablet by mouth twice daily    Disp: 180 tablet    Refills: 0    Start: 5/3/2024    Class: Normal    Non-formulary    Last ordered: 2 months ago (2/8/2024) by Carlos Doe MD    Last refill: 2/9/2024    Rx #: [id number]    Hypertension Medications Protocol Ymmlzn8705/03/2024 09:25 AM   Protocol Details CMP or BMP in past 12 months    Last BP reading less than 140/90    In person appointment or virtual visit in the past 12 mos or appointment in next 3 mos    EGFRCR or GFRAA > 50      To be filled at: [facility] [address] [place] [address] [phone], [phone]

## 2024-05-04 RX ORDER — FELODIPINE 5 MG/1
5 TABLET, EXTENDED RELEASE ORAL 2 TIMES DAILY
Qty: 180 TABLET | Refills: 0 | Status: SHIPPED | OUTPATIENT
Start: 2024-05-04

## 2024-05-13 DIAGNOSIS — I10 ESSENTIAL HYPERTENSION: Chronic | ICD-10-CM

## 2024-05-14 RX ORDER — HYDRALAZINE HYDROCHLORIDE 50 MG/1
50 TABLET, FILM COATED ORAL 2 TIMES DAILY
Qty: 180 TABLET | Refills: 0 | Status: SHIPPED | OUTPATIENT
Start: 2024-05-14

## 2024-05-14 RX ORDER — ALLOPURINOL 100 MG/1
100 TABLET ORAL DAILY
Qty: 90 TABLET | Refills: 0 | Status: SHIPPED | OUTPATIENT
Start: 2024-05-14

## 2024-05-14 RX ORDER — ATENOLOL 100 MG/1
100 TABLET ORAL DAILY
Qty: 90 TABLET | Refills: 0 | Status: SHIPPED | OUTPATIENT
Start: 2024-05-14

## 2024-05-14 NOTE — TELEPHONE ENCOUNTER
allopurinol 100 MG Oral Tab         Sig: Take 1 tablet (100 mg total) by mouth daily.    Disp: 90 tablet    Refills: 0    Start: 5/13/2024    Class: Normal    Non-formulary    Last ordered: 2 months ago (2/15/2024) by Carlos Doe MD        atenolol 100 MG Oral Tab         Sig: Take 1 tablet (100 mg total) by mouth daily.    Disp: 90 tablet    Refills: 0    Start: 5/13/2024    Class: Normal    Non-formulary    Last ordered: 2 months ago (2/15/2024) by Carlos Doe MD    Hypertension Medications Protocol Teiett4705/13/2024 06:38 PM   Protocol Details CMP or BMP in past 12 months    Last BP reading less than 140/90    In person appointment or virtual visit in the past 12 mos or appointment in next 3 mos    EGFRCR or GFRAA > 50       hydrALAZINE 50 MG Oral Tab         Sig: Take 1 tablet (50 mg total) by mouth 2 (two) times daily.    Disp: 180 tablet    Refills: 0    Start: 5/13/2024    Class: Normal    Non-formulary For: Essential hypertension    Last ordered: 2 months ago (2/15/2024) by Carlos Doe MD    Hypertension Medications Protocol Pnrash8105/13/2024 06:38 PM   Protocol Details CMP or BMP in past 12 months    Last BP reading less than 140/90    In person appointment or virtual visit in the past 12 mos or appointment in next 3 mos    EGFRCR or GFRAA > 50      To be filled at: [facility] [address] [place] [address] [phone], [phone]

## 2024-06-26 ENCOUNTER — APPOINTMENT (OUTPATIENT)
Dept: URBAN - METROPOLITAN AREA CLINIC 242 | Age: 79
Setting detail: DERMATOLOGY
End: 2024-06-26

## 2024-06-26 DIAGNOSIS — K6811 OTHER POSTOPERATIVE INFECTION: ICD-10-CM

## 2024-06-26 DIAGNOSIS — L72.8 OTHER FOLLICULAR CYSTS OF THE SKIN AND SUBCUTANEOUS TISSUE: ICD-10-CM

## 2024-06-26 DIAGNOSIS — T814XXA OTHER POSTOPERATIVE INFECTION: ICD-10-CM

## 2024-06-26 PROBLEM — T81.40XA INFECTION FOLLOWING A PROCEDURE, UNSPECIFIED, INITIAL ENCOUNTER: Status: ACTIVE | Noted: 2024-06-26

## 2024-06-26 PROCEDURE — OTHER INCISION AND DRAINAGE: OTHER

## 2024-06-26 PROCEDURE — OTHER COUNSELING: OTHER

## 2024-06-26 PROCEDURE — OTHER PRESCRIPTION: OTHER

## 2024-06-26 PROCEDURE — 99203 OFFICE O/P NEW LOW 30 MIN: CPT | Mod: 25

## 2024-06-26 PROCEDURE — 10061 I&D ABSCESS COMP/MULTIPLE: CPT

## 2024-06-26 RX ORDER — CEPHALEXIN 500 MG/1
CAPSULE ORAL
Qty: 30 | Refills: 0 | Status: ERX | COMMUNITY
Start: 2024-06-26

## 2024-06-26 ASSESSMENT — LOCATION DETAILED DESCRIPTION DERM
LOCATION DETAILED: RIGHT SUPERIOR UPPER BACK
LOCATION DETAILED: RIGHT SUPERIOR UPPER BACK

## 2024-06-26 ASSESSMENT — LOCATION ZONE DERM
LOCATION ZONE: TRUNK
LOCATION ZONE: TRUNK

## 2024-06-26 ASSESSMENT — LOCATION SIMPLE DESCRIPTION DERM
LOCATION SIMPLE: RIGHT UPPER BACK
LOCATION SIMPLE: RIGHT UPPER BACK

## 2024-06-26 NOTE — PROCEDURE: INCISION AND DRAINAGE
Detail Level: Detailed
Lesion Type: Abscess
I&D Type: complicated
Method: 11 blade
Curette: No
Anesthesia Type: 1% lidocaine with epinephrine
Anesthesia Volume In Cc: 3
Size Of Lesion In Cm (Optional But May Be Required For Some Insurances): 4
Drainage Amount?: significant
Drainage Type?: purulent  and cyst-like
Wound Care: Petrolatum
Dressing: dry sterile dressing
Curette Text (Optional): After the contents were expressed a curette was used to partially remove the cyst wall.
Epidermal Sutures: 4-0 Ethilon
Epidermal Closure: simple interrupted
Suture Text: The incision was partially closed with
Consent was obtained and risks were reviewed including but not limited to delayed wound healing, infection, need for multiple I and D's, and pain.
Post-Care Instructions: I reviewed with the patient in detail post-care instructions. Patient should keep wound covered and call the office should any redness, pain, swelling or worsening occur.

## 2024-07-11 ENCOUNTER — OFFICE VISIT (OUTPATIENT)
Dept: INTERNAL MEDICINE CLINIC | Facility: CLINIC | Age: 79
End: 2024-07-11
Payer: COMMERCIAL

## 2024-07-11 ENCOUNTER — LAB ENCOUNTER (OUTPATIENT)
Dept: LAB | Age: 79
End: 2024-07-11
Attending: INTERNAL MEDICINE
Payer: COMMERCIAL

## 2024-07-11 VITALS
SYSTOLIC BLOOD PRESSURE: 138 MMHG | BODY MASS INDEX: 34.8 KG/M2 | TEMPERATURE: 97 F | OXYGEN SATURATION: 98 % | HEIGHT: 69 IN | HEART RATE: 82 BPM | WEIGHT: 235 LBS | DIASTOLIC BLOOD PRESSURE: 80 MMHG | RESPIRATION RATE: 16 BRPM

## 2024-07-11 DIAGNOSIS — R79.89 ABNORMAL THYROID BLOOD TEST: ICD-10-CM

## 2024-07-11 DIAGNOSIS — R73.03 PREDIABETES: ICD-10-CM

## 2024-07-11 DIAGNOSIS — N18.31 STAGE 3A CHRONIC KIDNEY DISEASE (HCC): Chronic | ICD-10-CM

## 2024-07-11 DIAGNOSIS — I10 PRIMARY HYPERTENSION: Primary | Chronic | ICD-10-CM

## 2024-07-11 LAB
ANION GAP SERPL CALC-SCNC: 7 MMOL/L (ref 0–18)
BUN BLD-MCNC: 9 MG/DL (ref 9–23)
BUN/CREAT SERPL: 6.4 (ref 10–20)
CALCIUM BLD-MCNC: 9.4 MG/DL (ref 8.7–10.4)
CHLORIDE SERPL-SCNC: 107 MMOL/L (ref 98–112)
CO2 SERPL-SCNC: 27 MMOL/L (ref 21–32)
CREAT BLD-MCNC: 1.4 MG/DL
EGFRCR SERPLBLD CKD-EPI 2021: 50 ML/MIN/1.73M2 (ref 60–?)
EST. AVERAGE GLUCOSE BLD GHB EST-MCNC: 126 MG/DL (ref 68–126)
FASTING STATUS PATIENT QL REPORTED: NO
GLUCOSE BLD-MCNC: 116 MG/DL (ref 70–99)
HBA1C MFR BLD: 6 % (ref ?–5.7)
OSMOLALITY SERPL CALC.SUM OF ELEC: 292 MOSM/KG (ref 275–295)
POTASSIUM SERPL-SCNC: 4.1 MMOL/L (ref 3.5–5.1)
SODIUM SERPL-SCNC: 141 MMOL/L (ref 136–145)
T4 FREE SERPL-MCNC: 1.2 NG/DL (ref 0.8–1.7)
TSI SER-ACNC: 4.2 MIU/ML (ref 0.55–4.78)

## 2024-07-11 PROCEDURE — 84443 ASSAY THYROID STIM HORMONE: CPT | Performed by: INTERNAL MEDICINE

## 2024-07-11 PROCEDURE — 99213 OFFICE O/P EST LOW 20 MIN: CPT | Performed by: INTERNAL MEDICINE

## 2024-07-11 PROCEDURE — 3008F BODY MASS INDEX DOCD: CPT | Performed by: INTERNAL MEDICINE

## 2024-07-11 PROCEDURE — 3075F SYST BP GE 130 - 139MM HG: CPT | Performed by: INTERNAL MEDICINE

## 2024-07-11 PROCEDURE — 84439 ASSAY OF FREE THYROXINE: CPT | Performed by: INTERNAL MEDICINE

## 2024-07-11 PROCEDURE — 3079F DIAST BP 80-89 MM HG: CPT | Performed by: INTERNAL MEDICINE

## 2024-07-11 PROCEDURE — 80048 BASIC METABOLIC PNL TOTAL CA: CPT | Performed by: INTERNAL MEDICINE

## 2024-07-11 PROCEDURE — 83036 HEMOGLOBIN GLYCOSYLATED A1C: CPT | Performed by: INTERNAL MEDICINE

## 2024-07-24 ENCOUNTER — APPOINTMENT (OUTPATIENT)
Dept: URBAN - METROPOLITAN AREA CLINIC 242 | Age: 79
Setting detail: DERMATOLOGY
End: 2024-07-24

## 2024-07-24 DIAGNOSIS — D485 NEOPLASM OF UNCERTAIN BEHAVIOR OF SKIN: ICD-10-CM

## 2024-07-24 PROBLEM — D48.5 NEOPLASM OF UNCERTAIN BEHAVIOR OF SKIN: Status: ACTIVE | Noted: 2024-07-24

## 2024-07-24 PROCEDURE — 11404 EXC TR-EXT B9+MARG 3.1-4 CM: CPT

## 2024-07-24 PROCEDURE — OTHER EXCISION: OTHER

## 2024-07-24 PROCEDURE — 12034 INTMD RPR S/TR/EXT 7.6-12.5: CPT

## 2024-07-24 ASSESSMENT — LOCATION ZONE DERM: LOCATION ZONE: TRUNK

## 2024-07-24 ASSESSMENT — LOCATION SIMPLE DESCRIPTION DERM: LOCATION SIMPLE: RIGHT UPPER BACK

## 2024-07-24 ASSESSMENT — LOCATION DETAILED DESCRIPTION DERM: LOCATION DETAILED: RIGHT SUPERIOR UPPER BACK

## 2024-07-25 ENCOUNTER — APPOINTMENT (OUTPATIENT)
Dept: URBAN - METROPOLITAN AREA CLINIC 242 | Age: 79
Setting detail: DERMATOLOGY
End: 2024-07-25

## 2024-07-25 DIAGNOSIS — Z48.817 ENCOUNTER FOR SURGICAL AFTERCARE FOLLOWING SURGERY ON THE SKIN AND SUBCUTANEOUS TISSUE: ICD-10-CM

## 2024-07-25 PROCEDURE — 99024 POSTOP FOLLOW-UP VISIT: CPT

## 2024-07-25 PROCEDURE — OTHER POST-OP WOUND CHECK: OTHER

## 2024-07-25 ASSESSMENT — LOCATION SIMPLE DESCRIPTION DERM: LOCATION SIMPLE: RIGHT UPPER BACK

## 2024-07-25 ASSESSMENT — LOCATION DETAILED DESCRIPTION DERM: LOCATION DETAILED: RIGHT SUPERIOR UPPER BACK

## 2024-07-25 ASSESSMENT — LOCATION ZONE DERM: LOCATION ZONE: TRUNK

## 2024-07-25 NOTE — PROCEDURE: POST-OP WOUND CHECK
Detail Level: Detailed
Add 61039 Cpt? (Important Note: In 2017 The Use Of 86526 Is Being Tracked By Cms To Determine Future Global Period Reimbursement For Global Periods): yes
Wound Evaluated By: Dr Eddy
Additional Comments: Advised pt to follow up in 2 weeks for sutures remove.

## 2024-08-01 RX ORDER — FELODIPINE 5 MG/1
5 TABLET, EXTENDED RELEASE ORAL 2 TIMES DAILY
Qty: 180 TABLET | Refills: 0 | Status: SHIPPED | OUTPATIENT
Start: 2024-08-01

## 2024-08-08 ENCOUNTER — APPOINTMENT (OUTPATIENT)
Dept: URBAN - METROPOLITAN AREA CLINIC 242 | Age: 79
Setting detail: DERMATOLOGY
End: 2024-08-12

## 2024-08-08 DIAGNOSIS — Z48.02 ENCOUNTER FOR REMOVAL OF SUTURES: ICD-10-CM

## 2024-08-08 PROCEDURE — OTHER MIPS QUALITY: OTHER

## 2024-08-08 PROCEDURE — OTHER SUTURE REMOVAL (NO GLOBAL PERIOD): OTHER

## 2024-08-08 ASSESSMENT — LOCATION DETAILED DESCRIPTION DERM: LOCATION DETAILED: RIGHT SUPERIOR UPPER BACK

## 2024-08-08 ASSESSMENT — LOCATION ZONE DERM: LOCATION ZONE: TRUNK

## 2024-08-08 ASSESSMENT — LOCATION SIMPLE DESCRIPTION DERM: LOCATION SIMPLE: RIGHT UPPER BACK

## 2024-08-08 NOTE — PROCEDURE: MIPS QUALITY
Quality 47: Advance Care Plan: Advance Care Planning discussed and documented; advance care plan or surrogate decision maker documented in the medical record.
Quality 226: Preventive Care And Screening: Tobacco Use: Screening And Cessation Intervention: Patient screened for tobacco use, is a smoker AND did not receive Cessation Counseling during measurement period or in the six months prior to the measurement period
Detail Level: Detailed

## 2024-11-04 DIAGNOSIS — I10 ESSENTIAL HYPERTENSION: Chronic | ICD-10-CM

## 2024-11-04 RX ORDER — ATENOLOL 100 MG/1
100 TABLET ORAL DAILY
Qty: 90 TABLET | Refills: 0 | Status: SHIPPED | OUTPATIENT
Start: 2024-11-04

## 2024-11-04 RX ORDER — HYDRALAZINE HYDROCHLORIDE 50 MG/1
50 TABLET, FILM COATED ORAL 2 TIMES DAILY
Qty: 180 TABLET | Refills: 0 | Status: SHIPPED | OUTPATIENT
Start: 2024-11-04

## 2024-11-13 RX ORDER — FELODIPINE 5 MG/1
5 TABLET, EXTENDED RELEASE ORAL 2 TIMES DAILY
Qty: 180 TABLET | Refills: 0 | Status: SHIPPED | OUTPATIENT
Start: 2024-11-13

## 2025-01-13 ENCOUNTER — EKG ENCOUNTER (OUTPATIENT)
Dept: LAB | Age: 80
End: 2025-01-13
Attending: INTERNAL MEDICINE
Payer: COMMERCIAL

## 2025-01-13 ENCOUNTER — LAB ENCOUNTER (OUTPATIENT)
Dept: LAB | Age: 80
End: 2025-01-13
Attending: INTERNAL MEDICINE
Payer: COMMERCIAL

## 2025-01-13 ENCOUNTER — OFFICE VISIT (OUTPATIENT)
Dept: INTERNAL MEDICINE CLINIC | Facility: CLINIC | Age: 80
End: 2025-01-13
Payer: COMMERCIAL

## 2025-01-13 VITALS
TEMPERATURE: 97 F | DIASTOLIC BLOOD PRESSURE: 80 MMHG | BODY MASS INDEX: 34.07 KG/M2 | OXYGEN SATURATION: 97 % | SYSTOLIC BLOOD PRESSURE: 128 MMHG | RESPIRATION RATE: 16 BRPM | HEART RATE: 75 BPM | HEIGHT: 69 IN | WEIGHT: 230 LBS

## 2025-01-13 DIAGNOSIS — N18.31 STAGE 3A CHRONIC KIDNEY DISEASE (HCC): Chronic | ICD-10-CM

## 2025-01-13 DIAGNOSIS — E55.9 VITAMIN D DEFICIENCY: ICD-10-CM

## 2025-01-13 DIAGNOSIS — Z00.00 ROUTINE GENERAL MEDICAL EXAMINATION AT A HEALTH CARE FACILITY: Primary | ICD-10-CM

## 2025-01-13 DIAGNOSIS — I10 PRIMARY HYPERTENSION: Chronic | ICD-10-CM

## 2025-01-13 DIAGNOSIS — Z00.00 ROUTINE GENERAL MEDICAL EXAMINATION AT A HEALTH CARE FACILITY: ICD-10-CM

## 2025-01-13 DIAGNOSIS — M88.9 PAGET DISEASE OF BONE: Chronic | ICD-10-CM

## 2025-01-13 DIAGNOSIS — E79.0 HYPERURICEMIA: Chronic | ICD-10-CM

## 2025-01-13 DIAGNOSIS — E55.9 VITAMIN D DEFICIENCY: Chronic | ICD-10-CM

## 2025-01-13 LAB
ALBUMIN SERPL-MCNC: 4.4 G/DL (ref 3.2–4.8)
ALBUMIN/GLOB SERPL: 1.3 {RATIO} (ref 1–2)
ALP LIVER SERPL-CCNC: 88 U/L
ALT SERPL-CCNC: 21 U/L
ANION GAP SERPL CALC-SCNC: 6 MMOL/L (ref 0–18)
AST SERPL-CCNC: 26 U/L (ref ?–34)
ATRIAL RATE: 67 BPM
BASOPHILS # BLD AUTO: 0.03 X10(3) UL (ref 0–0.2)
BASOPHILS NFR BLD AUTO: 0.5 %
BILIRUB SERPL-MCNC: 0.4 MG/DL (ref 0.2–1.1)
BILIRUB UR QL STRIP.AUTO: NEGATIVE
BUN BLD-MCNC: 10 MG/DL (ref 9–23)
CALCIUM BLD-MCNC: 9.7 MG/DL (ref 8.7–10.4)
CHLORIDE SERPL-SCNC: 106 MMOL/L (ref 98–112)
CHOLEST SERPL-MCNC: 162 MG/DL (ref ?–200)
CLARITY UR REFRACT.AUTO: CLEAR
CO2 SERPL-SCNC: 27 MMOL/L (ref 21–32)
CREAT BLD-MCNC: 1.23 MG/DL
EGFRCR SERPLBLD CKD-EPI 2021: 59 ML/MIN/1.73M2 (ref 60–?)
EOSINOPHIL # BLD AUTO: 0.04 X10(3) UL (ref 0–0.7)
EOSINOPHIL NFR BLD AUTO: 0.6 %
ERYTHROCYTE [DISTWIDTH] IN BLOOD BY AUTOMATED COUNT: 14.3 %
EST. AVERAGE GLUCOSE BLD GHB EST-MCNC: 120 MG/DL (ref 68–126)
FASTING PATIENT LIPID ANSWER: YES
FASTING STATUS PATIENT QL REPORTED: YES
GLOBULIN PLAS-MCNC: 3.5 G/DL (ref 2–3.5)
GLUCOSE BLD-MCNC: 110 MG/DL (ref 70–99)
GLUCOSE UR STRIP.AUTO-MCNC: NORMAL MG/DL
HBA1C MFR BLD: 5.8 % (ref ?–5.7)
HCT VFR BLD AUTO: 40.9 %
HDLC SERPL-MCNC: 32 MG/DL (ref 40–59)
HGB BLD-MCNC: 13.3 G/DL
IMM GRANULOCYTES # BLD AUTO: 0.02 X10(3) UL (ref 0–1)
IMM GRANULOCYTES NFR BLD: 0.3 %
KETONES UR STRIP.AUTO-MCNC: NEGATIVE MG/DL
LDLC SERPL CALC-MCNC: 113 MG/DL (ref ?–100)
LEUKOCYTE ESTERASE UR QL STRIP.AUTO: NEGATIVE
LYMPHOCYTES # BLD AUTO: 2.72 X10(3) UL (ref 1–4)
LYMPHOCYTES NFR BLD AUTO: 42.4 %
MCH RBC QN AUTO: 29.2 PG (ref 26–34)
MCHC RBC AUTO-ENTMCNC: 32.5 G/DL (ref 31–37)
MCV RBC AUTO: 89.9 FL
MONOCYTES # BLD AUTO: 0.53 X10(3) UL (ref 0.1–1)
MONOCYTES NFR BLD AUTO: 8.3 %
NEUTROPHILS # BLD AUTO: 3.07 X10 (3) UL (ref 1.5–7.7)
NEUTROPHILS # BLD AUTO: 3.07 X10(3) UL (ref 1.5–7.7)
NEUTROPHILS NFR BLD AUTO: 47.9 %
NITRITE UR QL STRIP.AUTO: NEGATIVE
NONHDLC SERPL-MCNC: 130 MG/DL (ref ?–130)
OSMOLALITY SERPL CALC.SUM OF ELEC: 288 MOSM/KG (ref 275–295)
P AXIS: 66 DEGREES
P-R INTERVAL: 222 MS
PH UR STRIP.AUTO: 6.5 [PH] (ref 5–8)
PLATELET # BLD AUTO: 245 10(3)UL (ref 150–450)
POTASSIUM SERPL-SCNC: 3.9 MMOL/L (ref 3.5–5.1)
PROT SERPL-MCNC: 7.9 G/DL (ref 5.7–8.2)
PROT UR STRIP.AUTO-MCNC: NEGATIVE MG/DL
Q-T INTERVAL: 426 MS
QRS DURATION: 90 MS
QTC CALCULATION (BEZET): 450 MS
R AXIS: 43 DEGREES
RBC # BLD AUTO: 4.55 X10(6)UL
RBC UR QL AUTO: NEGATIVE
SODIUM SERPL-SCNC: 139 MMOL/L (ref 136–145)
SP GR UR STRIP.AUTO: 1.01 (ref 1–1.03)
T AXIS: 49 DEGREES
T4 SERPL-MCNC: 6.8 UG/DL
TRIGL SERPL-MCNC: 93 MG/DL (ref 30–149)
TSI SER-ACNC: 4.28 UIU/ML (ref 0.55–4.78)
URATE SERPL-MCNC: 5.7 MG/DL
UROBILINOGEN UR STRIP.AUTO-MCNC: NORMAL MG/DL
VENTRICULAR RATE: 67 BPM
VIT D+METAB SERPL-MCNC: 72.3 NG/ML (ref 30–100)
VLDLC SERPL CALC-MCNC: 16 MG/DL (ref 0–30)
WBC # BLD AUTO: 6.4 X10(3) UL (ref 4–11)

## 2025-01-13 PROCEDURE — 82306 VITAMIN D 25 HYDROXY: CPT | Performed by: INTERNAL MEDICINE

## 2025-01-13 PROCEDURE — 84436 ASSAY OF TOTAL THYROXINE: CPT | Performed by: INTERNAL MEDICINE

## 2025-01-13 PROCEDURE — 80061 LIPID PANEL: CPT | Performed by: INTERNAL MEDICINE

## 2025-01-13 PROCEDURE — 80050 GENERAL HEALTH PANEL: CPT | Performed by: INTERNAL MEDICINE

## 2025-01-13 PROCEDURE — 93010 ELECTROCARDIOGRAM REPORT: CPT | Performed by: INTERNAL MEDICINE

## 2025-01-13 PROCEDURE — 93005 ELECTROCARDIOGRAM TRACING: CPT

## 2025-01-13 PROCEDURE — 84550 ASSAY OF BLOOD/URIC ACID: CPT | Performed by: INTERNAL MEDICINE

## 2025-01-13 PROCEDURE — 81003 URINALYSIS AUTO W/O SCOPE: CPT | Performed by: INTERNAL MEDICINE

## 2025-01-13 PROCEDURE — 83036 HEMOGLOBIN GLYCOSYLATED A1C: CPT | Performed by: INTERNAL MEDICINE

## 2025-01-13 NOTE — PROGRESS NOTES
Easton Nixon  1945 is a 79 year old male.    No chief complaint on file.      HPI:   No cc  Current Outpatient Medications   Medication Sig Dispense Refill    felodipine ER 5 MG Oral Tablet 24 Hr Take 1 tablet (5 mg total) by mouth 2 (two) times daily. 180 tablet 0    ATENOLOL 100 MG Oral Tab Take 1 tablet by mouth once daily 90 tablet 0    HYDRALAZINE 50 MG Oral Tab Take 1 tablet by mouth twice daily 180 tablet 0    allopurinol 100 MG Oral Tab Take 1 tablet (100 mg total) by mouth daily. 90 tablet 0    latanoprost (XALATAN) 0.005 % Ophthalmic Solution Place 1 drop into both eyes nightly.        Past Medical History:    Abnormal stress test    BPH (benign prostatic hyperplasia)    Elevated serum creatinine    w/u     Gout    Kidney cysts    Leonel    Unspecified essential hypertension      Social History:  Social History     Socioeconomic History    Marital status:    Tobacco Use    Smoking status: Never    Smokeless tobacco: Never   Vaping Use    Vaping status: Never Used   Substance and Sexual Activity    Alcohol use: Never    Drug use: Never        REVIEW OF SYSTEMS:     General/Constitutional:   General able to do usual activities, good exercise tolerance, good general state of health, no fatigue, no fever, no weakness .   HEENT/Neck:   Head no dizziness, no lightheadedness. Eyes normal vision, no redness , no drainage. Ears no earaches, no fullness, normal hearing, no tinnitus. Nose and Sinuses no recurrent colds, no stuffiness, no discharge, no hay fever, no nosebleeds, no sinus trouble. Mouth and Pharynx no sore throats, no hoarseness. Neck no lumps, no goiter, no neck stiffness or pain.   Endocrine:   Diabetes none. Thyroid disorder none.  Does see an endocrinologist for Paget's disease prn  Dr Chery  Respiratory:   Patient denies chest pain, cough, CARDOZA (dyspnea on exertion),wheezing. Breathing normal pattern . Chest congestion none.   Cardiovascular:   Patient denies chest pain,  rheumatic fever,heart murmur.. Leg edema none. Orthopnea no. Palpitations none. PND (paroxsymal nocturnal dyspnea) none. Exercise 3 times per week   Gastrointestinal:   Patient denies abdominal pain, acid reflux, blood in stool, vomiting, nausea, heartburn denies any wt loss or gain no change in appetite noted no change in bowel movement noted. Dysphagia none.    Hematology:   Patient denies abnormal bleeding, easy bruising. Enlarged lymph nodes none.   Men Only:   Patient denies difficulty with erection, penile discharge, testicular pain or swelling, urgency/frequency.   Genitourinary:   Patient denies blood in urine, burning on urination, difficulty urinating, dysuria, urinary frequency , urinary incontinence/no history of kidney disease or genital abnormalities. Dysuria none. Nocturia None.  Does see DR Larkin for elevated PSA  Musculoskeletal:   Patient denies arthritis , back pain, muscle weakness . Joint pain none. Joint stiffness none.   Peripheral Vascular:   General no varicosities, no claudication.   Dermatologic:   Rash none.   Neurologic:   Patient denies dizziness, fainting, headache, loss of consciousness, memory loss, seizures, paresthesias, weakness. Tingling/numbness none. Trouble with balance none.   Psychiatric:   Patient denies anxiety, depression, hallucinations. Insomnia none/no hx of sleep disorder. Memory loss none.              EXAM:   /80 (BP Location: Left arm, Patient Position: Sitting, Cuff Size: adult)   Pulse 75   Temp 97 °F (36.1 °C) (Temporal)   Resp 16   Ht 5' 9\" (1.753 m)   Wt 230 lb (104.3 kg)   SpO2 97%   BMI 33.97 kg/m²     GENERAL:   Build: average .   HEENT:   Ear canals: normal.   EOM: within normal limits.Pupils BEERTL.Sclera and Conjunctiva normal.     Head: normocephalic.   Nasal septum: midline.   Nose: normal pink mucosa, no congestion, no swelling, no bleeding.   Oral cavity: normal, no lesions seen.   Turbinates: normal.   NECK:   Carotid bruit: none.    Cervical lymph nodes: unremarkable.   JVD: none.   Range of Motion: normal.   Thyroid: unremarkable.   HEART:   Clicks: no.   Edema: none visible .   Heart sounds: normal.   Murmurs: none.   Rhythm: regular.   LUNGS:   Auscultation: clear .   Chest Shape: normal .   Percussion: normal.   Rales: no .   Respiratory effort: normal .   Rhonchi: no.   Wheezes: no.   ABDOMEN:   Bowel sounds: normal.   General: normal.   Hernia: absent.  Diastases of recti noted  Liver, Spleen: no hepatosplenomegaly (HSM).   Tenderness: absent .   GENITOURINARY - MALE:   External genitals: unremarkable.   AMANDA: normal .   Penis: unremarkable.   Prostate: Per urology  Testicles: unremarkable.   EXTREMITIES:   Clubbing: none.   Cyanosis: absent .   Edema: trace   Pulses: present.   Tremors: no.   Varicose veins: absent .   MUSCULOSKELETAL:   Cervical spines: normal.   L-S spines: normal.   Lower extremity joints: normal.   Upper extremity joints: normal.   NEUROLOGICAL:   Babinski: negative..   Cerebellar Testing grossly/intact: yes..   Cranial Nerves: CN's II-XII grossly intact.   Gait: normal.   Motor: Power,tone,co-ordination normalInvoluntary movements and wasting none.   Reflexes: normal.   Sensory: all sensory modalities normal.   LYMPHATICS:   Cervical: none.   Groin: no adenopathy .   Inguinal: no adenopathy.   Supraclavicular: none.   DERMATOLOGY:   No rash        ASSESSMENT AND PLAN:   Diagnoses and all orders for this visit:    Routine general medical examination at a health care facility  -     CBC With Differential With Platelet; Future  -     Hemoglobin A1C; Future  -     Lipid Panel; Future  -     T4 (Thyroxine Total); Future  -     Assay, Thyroid Stim Hormone; Future  -     Urinalysis, Routine; Future    Primary hypertension  -     EKG 12 Lead performed at [facility]; Future    Hyperuricemia  -     Uric Acid; Future    Vitamin D deficiency  -     Vitamin D; Future    Stage 3a chronic kidney disease (HCC)  -     Comp  Metabolic Panel (14); Future    Paget disease of bone  -     Endocrine Referral - In Network        Await lab work prior to further recommendations.        The patient indicates understanding of these issues and agrees to the plan.  The patient is asked to Return in about 6 months (around 7/13/2025).  .      Carlos Doe MD

## 2025-01-15 DIAGNOSIS — E78.00 ELEVATED LDL CHOLESTEROL LEVEL: Primary | ICD-10-CM

## 2025-01-31 RX ORDER — ALLOPURINOL 100 MG/1
100 TABLET ORAL DAILY
Qty: 90 TABLET | Refills: 0 | Status: SHIPPED | OUTPATIENT
Start: 2025-01-31

## 2025-01-31 NOTE — TELEPHONE ENCOUNTER
No protocol     LOV: 1/13/25   RTC: 6 months  Filled: 5/14/24 #90   Labs: 1/13/25   No future appointments.

## 2025-02-03 DIAGNOSIS — I10 ESSENTIAL HYPERTENSION: Chronic | ICD-10-CM

## 2025-02-03 RX ORDER — HYDRALAZINE HYDROCHLORIDE 50 MG/1
50 TABLET, FILM COATED ORAL 2 TIMES DAILY
Qty: 180 TABLET | Refills: 0 | Status: SHIPPED | OUTPATIENT
Start: 2025-02-03

## 2025-02-03 RX ORDER — ATENOLOL 100 MG/1
100 TABLET ORAL DAILY
Qty: 90 TABLET | Refills: 0 | Status: SHIPPED | OUTPATIENT
Start: 2025-02-03

## 2025-02-03 NOTE — TELEPHONE ENCOUNTER
Name from pharmacy: Atenolol 100 MG Oral Tablet         Will file in chart as: ATENOLOL 100 MG Oral Tab    Sig: Take 1 tablet by mouth once daily    Disp: 90 tablet    Refills: 0    Start: 2/3/2025    Class: Normal    Non-formulary    Last ordered: 3 months ago (11/4/2024) by Carlos Doe MD    Last refill: 11/5/2024    Rx #: [id number]    Hypertension Medications Protocol Gsgqla8502/03/2025 05:50 AM   Protocol Details CMP or BMP in past 12 months    Last BP reading less than 140/90    In person appointment or virtual visit in the past 12 mos or appointment in next 3 mos    EGFRCR or GFRAA > 50    Medication is active on med list       Name from pharmacy: hydrALAZINE HCl 50 MG Oral Tablet         Will file in chart as: HYDRALAZINE 50 MG Oral Tab    Sig: Take 1 tablet by mouth twice daily    Disp: 180 tablet    Refills: 0    Start: 2/3/2025    Class: Normal    Non-formulary For: Essential hypertension    Last ordered: 3 months ago (11/4/2024) by Carlos Doe MD    Last refill: 11/5/2024    Rx #: [id number]    Hypertension Medications Protocol Bhvrll6002/03/2025 05:50 AM   Protocol Details CMP or BMP in past 12 months    Last BP reading less than 140/90    In person appointment or virtual visit in the past 12 mos or appointment in next 3 mos    EGFRCR or GFRAA > 50    Medication is active on med list      To be filled at: [facility] [address] [place] [address] [phone], [phone]

## 2025-02-07 RX ORDER — FELODIPINE 5 MG/1
5 TABLET, EXTENDED RELEASE ORAL 2 TIMES DAILY
Qty: 180 TABLET | Refills: 0 | Status: SHIPPED | OUTPATIENT
Start: 2025-02-07

## 2025-02-07 NOTE — TELEPHONE ENCOUNTER
felodipine ER 5 MG Oral Tablet 24 Hr         Sig: Take 1 tablet (5 mg total) by mouth 2 (two) times daily.    Disp: 180 tablet    Refills: 0    Start: 2/7/2025    Class: Normal    Non-formulary    Last ordered: 2 months ago (11/13/2024) by Carlos Doe MD    Hypertension Medications Protocol Hbjdlb7702/07/2025 07:16 AM   Protocol Details CMP or BMP in past 12 months    Last BP reading less than 140/90    In person appointment or virtual visit in the past 12 mos or appointment in next 3 mos    EGFRCR or GFRAA > 50    Medication is active on med list      To be filled at: [facility] [address] [place] [address] [phone], [phone]

## 2025-04-28 RX ORDER — ALLOPURINOL 100 MG/1
100 TABLET ORAL DAILY
Qty: 90 TABLET | Refills: 0 | Status: SHIPPED | OUTPATIENT
Start: 2025-04-28

## 2025-04-28 NOTE — TELEPHONE ENCOUNTER
Name from pharmacy: Allopurinol 100 MG Oral Tablet         Will file in chart as: ALLOPURINOL 100 MG Oral Tab    Sig: Take 1 tablet by mouth once daily    Disp: 90 tablet    Refills: 0    Start: 4/28/2025    Class: Normal    Non-formulary    Last ordered: 2 months ago (1/31/2025) by Carlos Doe MD    Last refill: 2/1/2025    Rx #: [id number]       To be filled at: [address] [phone], [phone]        LOV:  1/13/25  RTC: 6 months  Recent Labs: 1/13/25    Upcoming OV  none

## 2025-05-03 DIAGNOSIS — I10 ESSENTIAL HYPERTENSION: Chronic | ICD-10-CM

## 2025-05-05 RX ORDER — HYDRALAZINE HYDROCHLORIDE 50 MG/1
50 TABLET, FILM COATED ORAL 2 TIMES DAILY
Qty: 180 TABLET | Refills: 0 | Status: SHIPPED | OUTPATIENT
Start: 2025-05-05

## 2025-05-05 RX ORDER — ATENOLOL 100 MG/1
100 TABLET ORAL DAILY
Qty: 90 TABLET | Refills: 0 | Status: SHIPPED | OUTPATIENT
Start: 2025-05-05

## 2025-05-05 NOTE — TELEPHONE ENCOUNTER
Name from pharmacy: Atenolol 100 MG Oral Tablet          Will file in chart as: ATENOLOL 100 MG Oral Tab    Sig: Take 1 tablet by mouth once daily    Disp: 90 tablet    Refills: 0    Start: 5/3/2025    Class: Normal    Non-formulary    Last ordered: 3 months ago (2/3/2025) by Carlos Doe MD    Last refill: 2/5/2025    Rx #: [id number]    Hypertension Medications Protocol Bvpzvk2605/03/2025 05:51 AM   Protocol Details CMP or BMP in past 12 months    Last BP reading less than 140/90    In person appointment or virtual visit in the past 12 mos or appointment in next 3 mos    EGFRCR or GFRAA > 50    Medication is active on med list       Name from pharmacy: hydrALAZINE HCl 50 MG Oral Tablet         Will file in chart as: HYDRALAZINE 50 MG Oral Tab    Sig: Take 1 tablet by mouth twice daily    Disp: 180 tablet    Refills: 0    Start: 5/3/2025    Class: Normal    For: Essential hypertension    Last ordered: 3 months ago (2/3/2025) by Carlos Doe MD    Last refill: 2/5/2025    Rx #: [id number]    Hypertension Medications Protocol Rlusjr8305/03/2025 05:51 AM   Protocol Details CMP or BMP in past 12 months    Last BP reading less than 140/90    In person appointment or virtual visit in the past 12 mos or appointment in next 3 mos    EGFRCR or GFRAA > 50    Medication is active on med list      To be filled at: [facility] [address] [place] [address] [phone], [phone]

## 2025-05-12 RX ORDER — FELODIPINE 5 MG/1
5 TABLET, EXTENDED RELEASE ORAL 2 TIMES DAILY
Qty: 180 TABLET | Refills: 0 | Status: SHIPPED | OUTPATIENT
Start: 2025-05-12

## 2025-05-12 NOTE — TELEPHONE ENCOUNTER
felodipine ER 5 MG Oral Tablet 24 Hr         Sig: Take 1 tablet (5 mg total) by mouth 2 (two) times daily.    Disp: 180 tablet    Refills: 0    Start: 5/11/2025    Class: Normal    Non-formulary    Last ordered: 3 months ago (2/7/2025) by Carlos Doe MD    Hypertension Medications Protocol Rwqpye8105/11/2025 09:38 PM   Protocol Details CMP or BMP in past 12 months    Last BP reading less than 140/90    In person appointment or virtual visit in the past 12 mos or appointment in next 3 mos    EGFRCR or GFRAA > 50    Medication is active on med list      To be filled at: [facility] [address] [place] [address] [phone], [phone]

## 2025-06-16 ENCOUNTER — OFFICE VISIT (OUTPATIENT)
Dept: INTERNAL MEDICINE CLINIC | Facility: CLINIC | Age: 80
End: 2025-06-16
Payer: COMMERCIAL

## 2025-06-16 ENCOUNTER — LAB ENCOUNTER (OUTPATIENT)
Dept: LAB | Age: 80
End: 2025-06-16
Attending: INTERNAL MEDICINE
Payer: COMMERCIAL

## 2025-06-16 VITALS
RESPIRATION RATE: 16 BRPM | BODY MASS INDEX: 34 KG/M2 | OXYGEN SATURATION: 98 % | WEIGHT: 228.81 LBS | DIASTOLIC BLOOD PRESSURE: 56 MMHG | TEMPERATURE: 98 F | HEART RATE: 83 BPM | SYSTOLIC BLOOD PRESSURE: 126 MMHG

## 2025-06-16 DIAGNOSIS — R73.03 PREDIABETES: ICD-10-CM

## 2025-06-16 DIAGNOSIS — E21.3 HYPERPARATHYROIDISM (HCC): Chronic | ICD-10-CM

## 2025-06-16 DIAGNOSIS — E78.00 ELEVATED LDL CHOLESTEROL LEVEL: ICD-10-CM

## 2025-06-16 DIAGNOSIS — E55.9 VITAMIN D DEFICIENCY: ICD-10-CM

## 2025-06-16 DIAGNOSIS — E21.3 HYPERPARATHYROIDISM (HCC): ICD-10-CM

## 2025-06-16 DIAGNOSIS — E55.9 VITAMIN D DEFICIENCY: Chronic | ICD-10-CM

## 2025-06-16 DIAGNOSIS — M88.9 PAGET DISEASE OF BONE: Chronic | ICD-10-CM

## 2025-06-16 DIAGNOSIS — I10 PRIMARY HYPERTENSION: Primary | Chronic | ICD-10-CM

## 2025-06-16 DIAGNOSIS — N18.31 STAGE 3A CHRONIC KIDNEY DISEASE (HCC): Chronic | ICD-10-CM

## 2025-06-16 DIAGNOSIS — I10 PRIMARY HYPERTENSION: Chronic | ICD-10-CM

## 2025-06-16 DIAGNOSIS — M88.9 PAGET DISEASE OF BONE: ICD-10-CM

## 2025-06-16 DIAGNOSIS — E79.0 HYPERURICEMIA: Chronic | ICD-10-CM

## 2025-06-16 LAB
ANION GAP SERPL CALC-SCNC: 11 MMOL/L (ref 0–18)
BUN BLD-MCNC: 10 MG/DL (ref 9–23)
CALCIUM BLD-MCNC: 9.3 MG/DL (ref 8.7–10.6)
CHLORIDE SERPL-SCNC: 103 MMOL/L (ref 98–112)
CHOLEST SERPL-MCNC: 138 MG/DL (ref ?–200)
CO2 SERPL-SCNC: 27 MMOL/L (ref 21–32)
CREAT BLD-MCNC: 1.48 MG/DL (ref 0.7–1.3)
EGFRCR SERPLBLD CKD-EPI 2021: 47 ML/MIN/1.73M2 (ref 60–?)
EST. AVERAGE GLUCOSE BLD GHB EST-MCNC: 123 MG/DL (ref 68–126)
FASTING PATIENT LIPID ANSWER: YES
FASTING STATUS PATIENT QL REPORTED: YES
GLUCOSE BLD-MCNC: 103 MG/DL (ref 70–99)
HBA1C MFR BLD: 5.9 % (ref ?–5.7)
HDLC SERPL-MCNC: 32 MG/DL (ref 40–59)
LDLC SERPL CALC-MCNC: 88 MG/DL (ref ?–100)
NONHDLC SERPL-MCNC: 106 MG/DL (ref ?–130)
OSMOLALITY SERPL CALC.SUM OF ELEC: 291 MOSM/KG (ref 275–295)
POTASSIUM SERPL-SCNC: 4.1 MMOL/L (ref 3.5–5.1)
PTH-INTACT SERPL-MCNC: 80.4 PG/ML (ref 18.5–88)
SODIUM SERPL-SCNC: 141 MMOL/L (ref 136–145)
TRIGL SERPL-MCNC: 97 MG/DL (ref 30–149)
VIT D+METAB SERPL-MCNC: 75.2 NG/ML (ref 30–100)
VLDLC SERPL CALC-MCNC: 16 MG/DL (ref 0–30)

## 2025-06-16 PROCEDURE — 83970 ASSAY OF PARATHORMONE: CPT

## 2025-06-16 PROCEDURE — 80061 LIPID PANEL: CPT

## 2025-06-16 PROCEDURE — 3078F DIAST BP <80 MM HG: CPT | Performed by: INTERNAL MEDICINE

## 2025-06-16 PROCEDURE — 36415 COLL VENOUS BLD VENIPUNCTURE: CPT

## 2025-06-16 PROCEDURE — 83036 HEMOGLOBIN GLYCOSYLATED A1C: CPT

## 2025-06-16 PROCEDURE — 99214 OFFICE O/P EST MOD 30 MIN: CPT | Performed by: INTERNAL MEDICINE

## 2025-06-16 PROCEDURE — 80048 BASIC METABOLIC PNL TOTAL CA: CPT

## 2025-06-16 PROCEDURE — 82306 VITAMIN D 25 HYDROXY: CPT

## 2025-06-16 PROCEDURE — 3074F SYST BP LT 130 MM HG: CPT | Performed by: INTERNAL MEDICINE

## 2025-06-16 RX ORDER — ALLOPURINOL 100 MG/1
100 TABLET ORAL DAILY
Qty: 90 TABLET | Refills: 0 | Status: SHIPPED | OUTPATIENT
Start: 2025-06-16

## 2025-06-16 RX ORDER — HYDRALAZINE HYDROCHLORIDE 50 MG/1
50 TABLET, FILM COATED ORAL 2 TIMES DAILY
Qty: 180 TABLET | Refills: 3 | Status: SHIPPED | OUTPATIENT
Start: 2025-06-16

## 2025-06-16 RX ORDER — FELODIPINE 5 MG/1
5 TABLET, EXTENDED RELEASE ORAL 2 TIMES DAILY
Qty: 180 TABLET | Refills: 0 | Status: SHIPPED | OUTPATIENT
Start: 2025-06-16

## 2025-06-16 RX ORDER — ATENOLOL 100 MG/1
100 TABLET ORAL DAILY
Qty: 90 TABLET | Refills: 0 | Status: SHIPPED | OUTPATIENT
Start: 2025-06-16

## 2025-06-16 NOTE — PATIENT INSTRUCTIONS
- Will continue current medications  - Get blood tests done today  - Follow up with specialists as scheduled  - Follow up with me in January for your physical; follow up earlier as needed.    It was a pleasure seeing you in the clinic today.  Thank you for choosing the [facility] office for your healthcare needs. Please call at [phone] with any questions or concerns.    Shruti Julien MD

## 2025-06-16 NOTE — PROGRESS NOTES
Easton Nixon is a 79 year old male.   HPI:   Patient presents for follow up on chronic medical issues.  Previous patient of Dr. Doe.    Hypertension, CKD 3A - at goal blood pressure.  Diastolic blood pressure actually on the lower end.  Hyperparathyroidism, Vitamin D deficiency, Paget's disease of the bone - had seen Endocrinology in the past, but no recent follow up/labs.  Hyperuricemia - no recent flares.    Prediabetes, elevated LDL - lifestyle controlled.    Past medical, family, surgical and social history were reviewed as listed in the chart, and are unchanged from previous visit.    REVIEW OF SYSTEMS:   GENERAL/ const: no fevers/chills, no unintentional weight loss  EYES:no vision problems  HEENT: denies sinus pain or sinus tenderness  LUNGS: denies shortness of breath   CARDIOVASCULAR: denies chest pain  GI: denies nausea/emesis/ abdominal pain diarrhea constipation  : denies dysuria   MUSCULOSKELETAL: no acute arthralgias  NEURO: denies headaches  PSYCHIATRIC: denies issues  ENDOCRINE: no hot/cold intolerance  ALLERGY: Allergies[1]  PAST HISTORY:     Medications - Current[2]  Medical:  has a past medical history of Abnormal stress test, Back pain, BPH (benign prostatic hyperplasia), Elevated serum creatinine (08/07/2014), Food intolerance, Gout, Kidney cysts, and Unspecified essential hypertension.  Surgical:  has a past surgical history that includes Laparoscopic cholecystectomy (N/A, 02/28/2015); other surgical history (02/2015); cholecystectomy (otoniel. 2 yrs. ago); colonoscopy (otoniel. 3 yrs. ago); and glaucoma surgery.  Family: family history includes Thyroid Disorder in his mother.  Social:  reports that he has never smoked. He has never used smokeless tobacco. He reports that he does not drink alcohol and does not use drugs.  Wt Readings from Last 6 Encounters:   06/16/25 228 lb 12.8 oz (103.8 kg)   06/03/25 228 lb 9.6 oz (103.7 kg)   01/13/25 230 lb (104.3 kg)   07/11/24 235 lb (106.6 kg)    01/09/24 237 lb (107.5 kg)   12/26/23 237 lb 6.4 oz (107.7 kg)       EXAM:   /56 (BP Location: Left arm, Patient Position: Sitting, Cuff Size: large)   Pulse 83   Temp 98.1 °F (36.7 °C) (Temporal)   Resp 16   Wt 228 lb 12.8 oz (103.8 kg)   SpO2 98%   BMI 33.79 kg/m²   GENERAL: Alert and oriented, well developed, well nourished,in no apparent distress  HEENT: atraumatic, PERRLA, EOMI, normal lid and conjunctiva  LUNGS: clear to auscultation bilaterally, no wheezing/rubs  CARDIO: RRR without murmurs.  No clubbing, cyanosis or edema.  GI: soft non tender nondistended no hepatosplenomegaly, bowel sounds throughout  NEURO: CN II-XII intact, 5/5 strength all extremities  PSYCH: pleasant, appropriate mood and affect  ASSESSMENT AND PLAN:   1. Primary hypertension  2. Stage 3a chronic kidney disease (HCC)  At goal blood pressure, actually on the lower end diastolic wise.  More normotensive in January.  Will continue current regimen as below for now, may titrate down if diastolic blood pressure remains in this range.  Metabolic panel ordered.  - Basic Metabolic Panel (8); Future  - atenolol 100 MG Oral Tab; Take 1 tablet (100 mg total) by mouth daily.  Dispense: 90 tablet; Refill: 0  - felodipine ER 5 MG Oral Tablet 24 Hr; Take 1 tablet (5 mg total) by mouth 2 (two) times daily.  Dispense: 180 tablet; Refill: 0  - hydrALAZINE 50 MG Oral Tab; Take 1 tablet (50 mg total) by mouth 2 (two) times daily.  Dispense: 180 tablet; Refill: 3    3. Hyperparathyroidism (HCC)  4. Vitamin D deficiency  5. Paget disease of bone  Patient did see Elissa (Dr. Chery) back in 2018 - no follow up since then.  Per notes diagnosed with secondary hyperparathyroidism.  Will check repeat labs, may refer back to Elissa based on results.  - Vitamin D; Future  - PTH, Intact [E]; Future    6. Hyperuricemia  No recent flares.  Normal uric acid levels in January.  Will continue allopurinol 100 mg every day.  - allopurinol 100 MG Oral Tab; Take 1  tablet (100 mg total) by mouth daily.  Dispense: 90 tablet; Refill: 0    7. Prediabetes  8. Elevated LDL cholesterol level  Lifestyle controlled. Labs ordered as below.  - Basic Metabolic Panel (8); Future  - Hemoglobin A1C; Future  - Lipid Panel; Future    Patient Care Team:  Shruti Julien MD as PCP - General (Internal Medicine)  Tai Gallego MD (GASTROENTEROLOGY)  Fabricio Larkin MD (UROLOGY)  Silvestre Chery MD (ENDOCRINOLOGY)  Gisela Andrade APRN (Nurse Practitioner)  The patient indicates understanding of these issues and agrees to the plan.  The patient is asked to return to clinic in January 2026 for follow up on chronic issues/CPX, or earlier if acute issues arise.    Shruti Julien MD           [1] No Known Allergies  [2]   Current Outpatient Medications:     allopurinol 100 MG Oral Tab, Take 1 tablet (100 mg total) by mouth daily., Disp: 90 tablet, Rfl: 0    atenolol 100 MG Oral Tab, Take 1 tablet (100 mg total) by mouth daily., Disp: 90 tablet, Rfl: 0    felodipine ER 5 MG Oral Tablet 24 Hr, Take 1 tablet (5 mg total) by mouth 2 (two) times daily., Disp: 180 tablet, Rfl: 0    hydrALAZINE 50 MG Oral Tab, Take 1 tablet (50 mg total) by mouth 2 (two) times daily., Disp: 180 tablet, Rfl: 3    latanoprost (XALATAN) 0.005 % Ophthalmic Solution, Place 1 drop into both eyes nightly., Disp: , Rfl:     PEG 3350-KCl-Na Bicarb-NaCl 420 g Oral Recon Soln, Take as directed., Disp: 1 each, Rfl: 0

## 2025-07-03 NOTE — PROGRESS NOTES
HPI:     Easton Nixon is a 80 year old male with a PMH of HTN, pre-DM, gout.    He presents as a consult with:  1. Elevated PSA   - pMRI 9/11/23:  ~ 68 g, Pi2  2. BPH/LUTS    PCP - Wily  Prior Urologist - Cinda 5/18/20    LOV 8/22/23    Very healthy for his age.  LOV thought his visit was a waste of time. LUTS has worsened.    AUA SS is 19/35 with 4 n; 3 w, I, f; 2 s, u, ZEFERINO. Was 7/35 with 2 n, f; 1 w, I, ZEFERINO. Mostly happy with LUTS.  Incontinence: none  AMANDA declined. Prior AMANDA: ~ 40 g prostate, no nodules or tenderness    UA is negative    UTI hx: none  Gross hematuria: none  Tobacco hx: none  Kidney stone hx: none  Fam h/o  malignancy: none    Has mild ED but overall happy and prefers observation. Sex not a big priority.    Prior PSAs:  - 5.55 6/8/23  - 4.96 12/3/21  - 4.09 10/19/20  - 3.94 4/20/20  - 4.68 10/17/19  - 3.95 3/11/19  - 4.21 9/17/18  - 3.74 9/18/17  - 4.00 7/29/16  - 2.73 7/23/15  - 2.65 7/23/14    We discussed that his PSA would fall WNL given his age but has been rising.    Discussed proscar as option for LUTS and reviewed SEs. He wants to start this.    Will start proscar for BPH/LUTS. Observation for ED. PSA check today and f/u in 1 y with PSA prior.    HISTORY:  Past Medical History:    Abnormal stress test    Back pain    BPH (benign prostatic hyperplasia)    Elevated serum creatinine    w/u     Food intolerance    Glaucoma    Gout    High blood pressure    Kidney cysts    Leonel    Prediabetes    Unspecified essential hypertension      Past Surgical History:   Procedure Laterality Date    Cholecystectomy  otoniel. 2 yrs. ago    Colonoscopy  otoniel. 3 yrs. ago    Colonoscopy N/A 7/8/2025    Procedure: COLONOSCOPY WITH COLD & HOT SNARE POLYPECTOMY WITH CLIP PLACEMENT X1;  Surgeon: Troy Dudley MD;  Location:  ENDOSCOPY    Glaucoma surgery      Laparoscopic cholecystectomy N/A 02/28/2015    Procedure: LAPAROSCOPIC CHOLECYSTECTOMY;  Surgeon: Donato Garzon MD;  Location: [facility]  OR    Other surgical history  02/2015    Laparoscopic Cholecystectomy AND COMMON BILE DUCT MANIPULATION with Choleangiogram      Family History   Problem Relation Age of Onset    Thyroid Disorder Mother       Social History:   Social History     Socioeconomic History    Marital status:    Tobacco Use    Smoking status: Never    Smokeless tobacco: Never   Vaping Use    Vaping status: Never Used   Substance and Sexual Activity    Alcohol use: Never    Drug use: Never   Other Topics Concern    Caffeine Concern No    Exercise No        Medications (Active prior to today's visit):  Current Outpatient Medications   Medication Sig Dispense Refill    finasteride 5 MG Oral Tab Take 1 tablet (5 mg total) by mouth daily. 90 tablet 6    allopurinol 100 MG Oral Tab Take 1 tablet (100 mg total) by mouth daily. 90 tablet 0    atenolol 100 MG Oral Tab Take 1 tablet (100 mg total) by mouth daily. 90 tablet 0    felodipine ER 5 MG Oral Tablet 24 Hr Take 1 tablet (5 mg total) by mouth 2 (two) times daily. 180 tablet 0    hydrALAZINE 50 MG Oral Tab Take 1 tablet (50 mg total) by mouth 2 (two) times daily. 180 tablet 3    PEG 3350-KCl-Na Bicarb-NaCl 420 g Oral Recon Soln Take as directed. 1 each 0    latanoprost (XALATAN) 0.005 % Ophthalmic Solution Place 1 drop into both eyes nightly.         Allergies:  No Known Allergies      ROS:     A comprehensive 10 point review of systems was completed.  Pertinent positives and negatives noted in the the HPI.    PHYSICAL EXAM:     GENERAL APPEARANCE: well, developed, well nourished, in no acute distress  NEUROLOGIC: nonfocal, alert and oriented  HEAD: normocephalic, atraumatic  EYES: sclera non-icteric  EARS: hearing intact  ORAL CAVITY: mucosa moist  NECK/THYROID: no obvious goiter or masses  LUNGS: nonlabored breathing  ABDOMEN: soft, no obvious masses or tenderness  SKIN: no obvious rashes    : as noted above    ASSESSMENT/PLAN:   Diagnoses and all orders for this visit:    Elevated  PSA  -     URINALYSIS NONAUTO W/O SCOPE  -     PSA Total, Diagnostic; Future  -     PSA Total, Diagnostic; Future    BPH with obstruction/lower urinary tract symptoms  -     finasteride 5 MG Oral Tab; Take 1 tablet (5 mg total) by mouth daily.    Erectile dysfunction, unspecified erectile dysfunction type    - as noted above.    Thanks again for this consult.    Fabricio Larkin MD, FACS  Urologist  [facility]  Office: [phone]

## 2025-07-08 ENCOUNTER — HOSPITAL ENCOUNTER (OUTPATIENT)
Facility: HOSPITAL | Age: 80
Setting detail: HOSPITAL OUTPATIENT SURGERY
Discharge: HOME | End: 2025-07-08
Attending: INTERNAL MEDICINE | Admitting: INTERNAL MEDICINE
Payer: COMMERCIAL

## 2025-07-08 ENCOUNTER — ANESTHESIA (OUTPATIENT)
Dept: ENDOSCOPY | Facility: HOSPITAL | Age: 80
End: 2025-07-08
Payer: COMMERCIAL

## 2025-07-08 ENCOUNTER — ANESTHESIA EVENT (OUTPATIENT)
Dept: ENDOSCOPY | Facility: HOSPITAL | Age: 80
End: 2025-07-08
Payer: COMMERCIAL

## 2025-07-08 VITALS
HEART RATE: 67 BPM | WEIGHT: 229 LBS | OXYGEN SATURATION: 98 % | TEMPERATURE: 97 F | BODY MASS INDEX: 33.92 KG/M2 | HEIGHT: 69 IN | RESPIRATION RATE: 14 BRPM | SYSTOLIC BLOOD PRESSURE: 119 MMHG | DIASTOLIC BLOOD PRESSURE: 65 MMHG

## 2025-07-08 DIAGNOSIS — Z86.0101 HISTORY OF ADENOMATOUS POLYP OF COLON: ICD-10-CM

## 2025-07-08 PROCEDURE — 88305 TISSUE EXAM BY PATHOLOGIST: CPT | Performed by: INTERNAL MEDICINE

## 2025-07-08 DEVICE — REPLAY HEMOSTASIS CLIP, 11MM SPAN
Type: IMPLANTABLE DEVICE | Site: COLON
Brand: REPLAY

## 2025-07-08 RX ORDER — SODIUM CHLORIDE, SODIUM LACTATE, POTASSIUM CHLORIDE, CALCIUM CHLORIDE 600; 310; 30; 20 MG/100ML; MG/100ML; MG/100ML; MG/100ML
INJECTION, SOLUTION INTRAVENOUS CONTINUOUS
Status: DISCONTINUED | OUTPATIENT
Start: 2025-07-08 | End: 2025-07-08

## 2025-07-08 RX ORDER — NALOXONE HYDROCHLORIDE 0.4 MG/ML
0.08 INJECTION, SOLUTION INTRAMUSCULAR; INTRAVENOUS; SUBCUTANEOUS ONCE AS NEEDED
Status: DISCONTINUED | OUTPATIENT
Start: 2025-07-08 | End: 2025-07-08

## 2025-07-08 RX ORDER — LIDOCAINE HYDROCHLORIDE 10 MG/ML
INJECTION, SOLUTION EPIDURAL; INFILTRATION; INTRACAUDAL; PERINEURAL AS NEEDED
Status: DISCONTINUED | OUTPATIENT
Start: 2025-07-08 | End: 2025-07-08 | Stop reason: SURG

## 2025-07-08 RX ADMIN — SODIUM CHLORIDE, SODIUM LACTATE, POTASSIUM CHLORIDE, CALCIUM CHLORIDE: 600; 310; 30; 20 INJECTION, SOLUTION INTRAVENOUS at 10:08:00

## 2025-07-08 RX ADMIN — LIDOCAINE HYDROCHLORIDE 50 MG: 10 INJECTION, SOLUTION EPIDURAL; INFILTRATION; INTRACAUDAL; PERINEURAL at 09:46:00

## 2025-07-08 NOTE — ANESTHESIA PREPROCEDURE EVALUATION
PRE-OP EVALUATION    Patient Name: Easton Nixon    Admit Diagnosis: History of adenomatous polyp of colon [Z86.0101]    Pre-op Diagnosis: History of adenomatous polyp of colon [Z86.0101]    COLONOSCOPY    Anesthesia Procedure: COLONOSCOPY    Surgeons and Role:     * Troy Dudley MD - Primary    Pre-op vitals reviewed.        Body mass index is 33.82 kg/m².    Current medications reviewed.  Hospital Medications:  Current Medications[1]    Outpatient Medications:   Prescriptions Prior to Admission[2]    Allergies: Patient has no known allergies.      Anesthesia Evaluation        Anesthetic Complications           GI/Hepatic/Renal    Negative GI/hepatic/renal ROS.                             Cardiovascular  Comment: STRESS ECHO:  The stress echocardiographic images were obtained at peak   exercise.  Test reveals improved LV contractility with a reduction in left   ventricular cavity size, which is a normal physiologic response to   exercise.  There were no obvious segmental wall motion abnormalities   detected.     CONCLUSION: This is a normal stress echo. The sensitivity of this test is   somewhat reduced as the patient did not achieve his target heart rate.         Exercise tolerance: good     MET: >4      (+) hypertension                                     Endo/Other    Negative endo/other ROS.                              Pulmonary    Negative pulmonary ROS.                       Neuro/Psych                 (+) neuromuscular disease                     Past Surgical History[3]  Social Hx on file[4]  History   Drug Use Unknown     Available pre-op labs reviewed.     Lab Results   Component Value Date     06/16/2025    K 4.1 06/16/2025     06/16/2025    CO2 27.0 06/16/2025    BUN 10 06/16/2025    CREATSERUM 1.48 (H) 06/16/2025     (H) 06/16/2025    CA 9.3 06/16/2025            Airway      Mallampati: III  Mouth opening: 3 FB  TM distance: 4 - 6 cm  Neck ROM: full  Cardiovascular      Rhythm: regular  Rate: normal     Dental    Dentition appears grossly intact         Pulmonary      Breath sounds clear to auscultation bilaterally.               Other findings              ASA: 2   Plan: MAC  NPO status verified and patient meets guidelines.    Post-procedure pain management plan discussed with surgeon and patient.    Comment: I explained the intrinsic risks of MAC anesthesia to Easton Nixon including intraoperative awareness/recall, PONV, post-operative pain/discomfort, risk of aspiration, sore throat, airway management and, conversion to general anesthesia. Pt endorses understanding. All questions answered and concerns addressed.      Plan/risks discussed with: patient and spouse                Present on Admission:  **None**             [1]   No current facility-administered medications on file as of 7/8/2025.   [2]   Medications Prior to Admission   Medication Sig Dispense Refill Last Dose/Taking    allopurinol 100 MG Oral Tab Take 1 tablet (100 mg total) by mouth daily. 90 tablet 0 Taking    atenolol 100 MG Oral Tab Take 1 tablet (100 mg total) by mouth daily. 90 tablet 0 Taking    felodipine ER 5 MG Oral Tablet 24 Hr Take 1 tablet (5 mg total) by mouth 2 (two) times daily. 180 tablet 0 Taking    hydrALAZINE 50 MG Oral Tab Take 1 tablet (50 mg total) by mouth 2 (two) times daily. 180 tablet 3 Taking    latanoprost (XALATAN) 0.005 % Ophthalmic Solution Place 1 drop into both eyes nightly.   Taking    PEG 3350-KCl-Na Bicarb-NaCl 420 g Oral Recon Soln Take as directed. 1 each 0    [3]   Past Surgical History:  Procedure Laterality Date    Cholecystectomy  otoniel. 2 yrs. ago    Colonoscopy  otoniel. 3 yrs. ago    Glaucoma surgery      Laparoscopic cholecystectomy N/A 02/28/2015    Procedure: LAPAROSCOPIC CHOLECYSTECTOMY;  Surgeon: Donato Garzon MD;  Location:  MAIN OR    Other surgical history  02/2015    Laparoscopic Cholecystectomy AND COMMON BILE DUCT MANIPULATION  with Choleangiogram   [4]   Social History  Socioeconomic History    Marital status:    Tobacco Use    Smoking status: Never    Smokeless tobacco: Never   Vaping Use    Vaping status: Never Used   Substance and Sexual Activity    Alcohol use: Never    Drug use: Never   Other Topics Concern    Caffeine Concern No    Exercise No

## 2025-07-08 NOTE — DISCHARGE INSTRUCTIONS
Home Care Instructions for Colonoscopy with Sedation    Diet:  - Resume your regular diet as tolerated unless otherwise instructed.  - Start with light meals to minimize bloating.  - Do not drink alcohol today.    Medication:  - If you have questions about resuming your normal medications, please contact your Primary Care Physician.    Activities:  - Take it easy today. Do not return to work today.  - Do not drive today.  - Do not operate any machinery today (including kitchen equipment).    Colonoscopy:  - You may notice some rectal \"spotting\" (a little blood on the toilet tissue) for a day or two after the exam. This is normal.  - If you experience any rectal bleeding (not spotting), persistent tenderness or sharp severe abdominal pains, oral temperature over 100 degrees Fahrenheit, light-headedness or dizziness, or any other problems, contact your doctor.    **If unable to reach your doctor, please go to the [facility] Emergency Room**    - Your referring physician will receive a full report of your examination.  - If you do not hear from your doctor's office within two weeks of your biopsy, please call them for your results.    You may be able to see your laboratory results in [employer] between 4 and 7 business days.  In some cases, your physician may not have viewed the results before they are released to [employer].  If you have questions regarding your results contact the physician who ordered the test/exam by phone or via [employer] by choosing \"Ask a Medical Question.\"

## 2025-07-08 NOTE — ANESTHESIA POSTPROCEDURE EVALUATION
[facility]    Easton Nixon Patient Status:  Hospital Outpatient Surgery   Age/Gender 80 year old male MRN [id number]   Location [facility] ENDOSCOPY PAIN [facility]ENTER Attending Troy Dudley MD   Hosp Day # 0 PCP Shruti Julien MD       Anesthesia Post-op Note    COLONOSCOPY WITH COLD & HOT SNARE POLYPECTOMY WITH CLIP PLACEMENT X1    Procedure Summary       Date: 07/08/25 Room / Location:  ENDOSCOPY 02 /  ENDOSCOPY    Anesthesia Start: 0942 Anesthesia Stop: 1010    Procedure: COLONOSCOPY WITH COLD & HOT SNARE POLYPECTOMY WITH CLIP PLACEMENT X1 Diagnosis:       History of adenomatous polyp of colon      (polypS)    Surgeons: Troy Dudley MD Anesthesiologist: Roman Grijalva MD    Anesthesia Type: MAC ASA Status: 2            Anesthesia Type: MAC    Vitals Value Taken Time   /56 07/08/25 10:10   Temp 98 07/08/25 10:10   Pulse 69 07/08/25 10:10   Resp 18 07/08/25 10:10   SpO2 94 07/08/25 10:10           Patient Location: PACU    Anesthesia Type: MAC    Airway Patency: patent    Postop Pain Control: adequate    Mental Status: mildly sedated but able to meaningfully participate in the post-anesthesia evaluation    Nausea/Vomiting: none    Cardiopulmonary/Hydration status: stable euvolemic    Complications: no apparent anesthesia related complications    Postop vital signs: stable    Dental Exam: Unchanged from Preop    Patient to be discharged from PACU when criteria met.

## 2025-07-08 NOTE — H&P
[facility]  Pre-op H and P    Easton Nixon Patient Status:  Hospital Outpatient Surgery    1945 MRN [id number]   Location [facility] ENDOSCOPY PAIN CENTER Attending Troy Dudley MD   Date 2025 PCP Shruti Julien MD     CC: H/o colon adenomas    History of Present Illness:  Easton Nixon is a a(n) 80 year old male. H/o colon adenomas    History:  Past Medical History[1]  Past Surgical History[2]  Family History[3]   reports that he has never smoked. He has never used smokeless tobacco. He reports that he does not drink alcohol and does not use drugs.    Allergies:  Allergies[4]    Medications:  Current Hospital Medications[5]    Physical Exam:    Blood pressure 149/71, pulse 52, temperature 97.3 °F (36.3 °C), temperature source Temporal, resp. rate 16, height 5' 9\" (1.753 m), weight 229 lb (103.9 kg), SpO2 96%.    General: Appears alert, oriented x3 and in no acute distress.  CV: Normal rate   Lungs: Normal effort   Skin: Warm and dry.  Laboratory Data:       Imaging:      Assessment/Plan/Procedure:  Problem List[6]    H/o colon adenomas    Plan:  Colonoscopy    Troy Dudley MD  2025  9:40 AM       [1]   Past Medical History:   Abnormal stress test    Back pain    BPH (benign prostatic hyperplasia)    Elevated serum creatinine    w/u     Food intolerance    Glaucoma    Gout    High blood pressure    Kidney cysts    Leonel    Prediabetes    Unspecified essential hypertension   [2]   Past Surgical History:  Procedure Laterality Date    Cholecystectomy  otoniel. 2 yrs. ago    Colonoscopy  otoniel. 3 yrs. ago    Glaucoma surgery      Laparoscopic cholecystectomy N/A 2015    Procedure: LAPAROSCOPIC CHOLECYSTECTOMY;  Surgeon: Donato Garzon MD;  Location:  MAIN OR    Other surgical history  2015    Laparoscopic Cholecystectomy AND COMMON BILE DUCT MANIPULATION with Choleangiogram   [3]   Family History  Problem Relation Age of Onset    Thyroid Disorder Mother    [4] No  Known Allergies  [5]   Current Facility-Administered Medications:     lactated ringers infusion, , Intravenous, Continuous  [6]   Patient Active Problem List  Diagnosis    Hypertension    Hyperuricemia    Kidney cysts    Stage 3a chronic kidney disease (HCC)    Vitamin D deficiency    Paget disease of bone    Hyperparathyroidism (HCC)    Elevated PSA    Benign neoplasm of sigmoid colon    Benign neoplasm of transverse colon    Benign neoplasm of descending colon    History of adenomatous polyp of colon    Benign neoplasm of cecum    Prediabetes

## 2025-07-08 NOTE — OPERATIVE REPORT
Easton Nixon Patient Status:  Hospital Outpatient Surgery    1945 MRN [id number]   [facility] ENDOSCOPY PAIN CENTER Attending Troy Dudley MD   Date 2025 PCP Shruti Julien MD     PREOPERATIVE DIAGNOSIS/INDICATION: H/o adenomas  POSTOPERTATIVE DIAGNOSIS: polyps  PROCEDURE PERFORMED: COLONOSCOPY with biopsy and snare polypectomy  SEDATION: MAC sedation provided by General Anesthesia    TIME OUT WAS PERFORMED    INFORMED CONSENT: Risks, benefits and alternatives to the procedure were explained to the patient including but not limited to bleeding, infection, perforation, adverse drug reactions, pancreatitis and the need for hospitalization and surgery if this occurs, the patient understands and agrees to procedure.  PROCEDURE DESCRIPTION: After careful digital rectal examination a pediatric colonoscope was introduced into the patients rectum, advanced pass the recto sigmoid junction, into the descending colon, splenic flexure, transverse colon, hepatic flexure, ascending colon, cecum, confirmed by landmarks, including the appendiceal orifice and ileocecal valve. Careful examination of the above described areas was performed on withdrawal of the endoscope. Retroflexion was performed on the rectum. The patient tolerated the procedure well, there were no immediate complication immediately following the procedure, and the patient was transferred to recovery in stable condition.  QUALITY OF PREPARATION: [place] Bowel Preparation Scale:            -      Right colon 3, Transverse colon 3, Left colon 3   FINDINGS/THERAPEUTICS:  COLON: Four 2 mm sessile ascending colon polyps s/p excisional biopsy with cold forceps, 2 mm sessile descending colon polyp s/p excisional biopsy with cold forceps, 2 mm sessile sigmoid polyp s/p excisional biopsy with cold forceps, 20 mm pedunculated polyp sigmoid colon s/p hot snare polypectomy, single endoclip was placed for prophylaxis  RECOMMENDATIONS:    Post Colonoscopy/polypectomy precautions, watch for bleeding, infection, perforation, adverse drug reactions   Follow biopsies.  Repeat colonoscopy in 3 years if clinically indicated at that time.  Troy Dudley MD  7/8/2025  10:08 AM

## 2025-07-09 ENCOUNTER — RESULTS FOLLOW-UP (OUTPATIENT)
Dept: ENDOSCOPY | Facility: HOSPITAL | Age: 80
End: 2025-07-09

## 2025-07-09 NOTE — PROGRESS NOTES
Date: 2025    To: Easton SHARI Hussain  : 1945    I hope this letter finds you doing well.  I am writing to inform you of the following:          The biopsies obtained from your recent colonoscopy indicate that the polyps were adenomas which, although benign (no evidence of cancer), are considered potentially pre-cancerous if they are left alone for many years.  They were removed at the time of your colonoscopy.           I am advising you to undergo repeat colonoscopy in 3 years if clinically indicated at that time. We will send you a reminder card when the time of your procedure is near.      Please call the office at [phone] if there are any questions.    Regards,    Troy Dudley M.D.

## 2025-07-16 ENCOUNTER — LAB ENCOUNTER (OUTPATIENT)
Dept: LAB | Age: 80
End: 2025-07-16
Attending: UROLOGY
Payer: COMMERCIAL

## 2025-07-16 ENCOUNTER — OFFICE VISIT (OUTPATIENT)
Dept: SURGERY | Facility: CLINIC | Age: 80
End: 2025-07-16
Payer: COMMERCIAL

## 2025-07-16 DIAGNOSIS — N40.1 BPH WITH OBSTRUCTION/LOWER URINARY TRACT SYMPTOMS: ICD-10-CM

## 2025-07-16 DIAGNOSIS — N52.9 ERECTILE DYSFUNCTION, UNSPECIFIED ERECTILE DYSFUNCTION TYPE: ICD-10-CM

## 2025-07-16 DIAGNOSIS — N13.8 BPH WITH OBSTRUCTION/LOWER URINARY TRACT SYMPTOMS: ICD-10-CM

## 2025-07-16 DIAGNOSIS — R97.20 ELEVATED PSA: Primary | ICD-10-CM

## 2025-07-16 DIAGNOSIS — R97.20 ELEVATED PSA: ICD-10-CM

## 2025-07-16 LAB
APPEARANCE: CLEAR
BILIRUBIN: NEGATIVE
GLUCOSE (URINE DIPSTICK): NEGATIVE MG/DL
KETONES (URINE DIPSTICK): 15 MG/DL
MULTISTIX LOT#: ABNORMAL NUMERIC
NITRITE, URINE: NEGATIVE
OCCULT BLOOD: NEGATIVE
PH, URINE: 7 (ref 4.5–8)
PROTEIN (URINE DIPSTICK): 30 MG/DL
PSA SERPL-MCNC: 5.44 NG/ML (ref ?–4)
SPECIFIC GRAVITY: 1.01 (ref 1–1.03)
URINE-COLOR: YELLOW
UROBILINOGEN,SEMI-QN: 1 MG/DL (ref 0–1.9)

## 2025-07-16 PROCEDURE — G2211 COMPLEX E/M VISIT ADD ON: HCPCS | Performed by: UROLOGY

## 2025-07-16 PROCEDURE — 84153 ASSAY OF PSA TOTAL: CPT

## 2025-07-16 PROCEDURE — 36415 COLL VENOUS BLD VENIPUNCTURE: CPT

## 2025-07-16 PROCEDURE — 81002 URINALYSIS NONAUTO W/O SCOPE: CPT | Performed by: UROLOGY

## 2025-07-16 PROCEDURE — 99214 OFFICE O/P EST MOD 30 MIN: CPT | Performed by: UROLOGY

## 2025-07-16 RX ORDER — FINASTERIDE 5 MG/1
5 TABLET, FILM COATED ORAL DAILY
Qty: 90 TABLET | Refills: 6 | Status: SHIPPED | OUTPATIENT
Start: 2025-07-16

## (undated) DIAGNOSIS — I10 ESSENTIAL HYPERTENSION: Chronic | ICD-10-CM

## (undated) DEVICE — 3M™ RED DOT™ MONITORING ELECTRODE WITH FOAM TAPE AND STICKY GEL, 50/BAG, 20/CASE, 72/PLT 2570: Brand: RED DOT™

## (undated) DEVICE — KIT CUSTOM ENDOPROCEDURE STERIS

## (undated) DEVICE — REM POLYHESIVE ADULT PATIENT RETURN ELECTRODE: Brand: VALLEYLAB

## (undated) DEVICE — LASSO POLYPECTOMY SNARE: Brand: LASSO

## (undated) DEVICE — KIT VLV 5 PC AIR H2O SUCT BX ENDOGATOR CONN

## (undated) DEVICE — V2 SPECIMEN COLLECTION MANIFOLD KIT: Brand: NEPTUNE

## (undated) DEVICE — 10FT COMBINED O2 DELIVERY/CO2 MONITORING. FILTER WITH MICROSTREAM TYPE LUER: Brand: DUAL ADULT NASAL CANNULA

## (undated) DEVICE — 1200CC GUARDIAN II: Brand: GUARDIAN

## (undated) NOTE — LETTER
04/16/20        Easton [address] [address] [facility]      Dear Tonya Leary records indicate that you have outstanding lab work and or testing that was ordered for you and has not yet been completed:  Orders Placed This Encounter

## (undated) NOTE — MR AVS SNAPSHOT
Sarai  [address] [address]  [place] [id number]  [phone]               Thank you for choosing us for your health care visit with Sebas Wolff MD.  We are glad to serve you and happy to provide you with this payne TAKE ONE TABLET BY MOUTH TWICE DAILY   Commonly known as:  APRESOLINE           latanoprost 0.005 % Soln   Place 1 drop into both eyes nightly.    Commonly known as:  [address] [facility] can access your MyChart to more a

## (undated) NOTE — LETTER
Date: 2025  Patient Name:  Easton Nixon             Address: [address] [place] [id number]    : 1945    UNOPENED MYCHART MESSAGE     Dear Easton Nixon,    I hope this letter finds you doing well.  I am writing to inform you of the following:                                    The biopsies obtained from your recent colonoscopy indicate that the polyps were adenomas which, although benign (no evidence of cancer), are considered potentially pre-cancerous if they are left alone for many years.  They were removed at the time of your colonoscopy.                           I am advising you to undergo repeat colonoscopy in 3 years if clinically indicated at that time. We will send you a reminder card when the time of your procedure is near.                   Please call the office at [phone] if there are any questions.     Regards,     Troy Dudley M.D.